# Patient Record
Sex: FEMALE | Race: WHITE | Employment: OTHER
[De-identification: names, ages, dates, MRNs, and addresses within clinical notes are randomized per-mention and may not be internally consistent; named-entity substitution may affect disease eponyms.]

---

## 2017-05-27 ENCOUNTER — HEALTH MAINTENANCE LETTER (OUTPATIENT)
Age: 67
End: 2017-05-27

## 2017-09-08 ENCOUNTER — APPOINTMENT (OUTPATIENT)
Dept: MRI IMAGING | Facility: CLINIC | Age: 67
End: 2017-09-08
Attending: EMERGENCY MEDICINE
Payer: MEDICARE

## 2017-09-08 ENCOUNTER — HOSPITAL ENCOUNTER (EMERGENCY)
Facility: CLINIC | Age: 67
Discharge: HOME | End: 2017-09-08
Attending: EMERGENCY MEDICINE | Admitting: EMERGENCY MEDICINE
Payer: MEDICARE

## 2017-09-08 VITALS
DIASTOLIC BLOOD PRESSURE: 84 MMHG | SYSTOLIC BLOOD PRESSURE: 143 MMHG | TEMPERATURE: 96.4 F | OXYGEN SATURATION: 93 % | HEART RATE: 64 BPM | WEIGHT: 117 LBS | BODY MASS INDEX: 20.08 KG/M2 | RESPIRATION RATE: 17 BRPM

## 2017-09-08 DIAGNOSIS — I44.7 LEFT BUNDLE-BRANCH BLOCK: ICD-10-CM

## 2017-09-08 DIAGNOSIS — R42 VERTIGO: ICD-10-CM

## 2017-09-08 LAB
ANION GAP SERPL CALCULATED.3IONS-SCNC: 12 MMOL/L (ref 3–14)
APTT PPP: 27 SEC (ref 22–37)
BASOPHILS # BLD AUTO: 0 10E9/L (ref 0–0.2)
BASOPHILS NFR BLD AUTO: 0.2 %
BUN SERPL-MCNC: 16 MG/DL (ref 7–30)
CALCIUM SERPL-MCNC: 8.5 MG/DL (ref 8.5–10.1)
CHLORIDE SERPL-SCNC: 107 MMOL/L (ref 94–109)
CO2 SERPL-SCNC: 27 MMOL/L (ref 20–32)
CREAT SERPL-MCNC: 0.57 MG/DL (ref 0.52–1.04)
DIFFERENTIAL METHOD BLD: NORMAL
EOSINOPHIL # BLD AUTO: 0.1 10E9/L (ref 0–0.7)
EOSINOPHIL NFR BLD AUTO: 1.1 %
ERYTHROCYTE [DISTWIDTH] IN BLOOD BY AUTOMATED COUNT: 13.4 % (ref 10–15)
GFR SERPL CREATININE-BSD FRML MDRD: >90 ML/MIN/1.7M2
GLUCOSE BLDC GLUCOMTR-MCNC: 71 MG/DL (ref 70–99)
GLUCOSE BLDC GLUCOMTR-MCNC: 89 MG/DL (ref 70–99)
GLUCOSE SERPL-MCNC: 86 MG/DL (ref 70–99)
HCT VFR BLD AUTO: 38.9 % (ref 35–47)
HGB BLD-MCNC: 12.8 G/DL (ref 11.7–15.7)
IMM GRANULOCYTES # BLD: 0 10E9/L (ref 0–0.4)
IMM GRANULOCYTES NFR BLD: 0.2 %
LYMPHOCYTES # BLD AUTO: 1.8 10E9/L (ref 0.8–5.3)
LYMPHOCYTES NFR BLD AUTO: 37.5 %
MCH RBC QN AUTO: 30.3 PG (ref 26.5–33)
MCHC RBC AUTO-ENTMCNC: 32.9 G/DL (ref 31.5–36.5)
MCV RBC AUTO: 92 FL (ref 78–100)
MONOCYTES # BLD AUTO: 0.2 10E9/L (ref 0–1.3)
MONOCYTES NFR BLD AUTO: 4.7 %
NEUTROPHILS # BLD AUTO: 2.6 10E9/L (ref 1.6–8.3)
NEUTROPHILS NFR BLD AUTO: 56.3 %
NRBC # BLD AUTO: 0 10*3/UL
NRBC BLD AUTO-RTO: 0 /100
PLATELET # BLD AUTO: 175 10E9/L (ref 150–450)
POTASSIUM SERPL-SCNC: 3.7 MMOL/L (ref 3.4–5.3)
RBC # BLD AUTO: 4.22 10E12/L (ref 3.8–5.2)
SODIUM SERPL-SCNC: 146 MMOL/L (ref 133–144)
WBC # BLD AUTO: 4.7 10E9/L (ref 4–11)

## 2017-09-08 PROCEDURE — 70544 MR ANGIOGRAPHY HEAD W/O DYE: CPT

## 2017-09-08 PROCEDURE — 25000128 H RX IP 250 OP 636: Performed by: EMERGENCY MEDICINE

## 2017-09-08 PROCEDURE — A9585 GADOBUTROL INJECTION: HCPCS | Performed by: EMERGENCY MEDICINE

## 2017-09-08 PROCEDURE — 70549 MR ANGIOGRAPH NECK W/O&W/DYE: CPT

## 2017-09-08 PROCEDURE — 96361 HYDRATE IV INFUSION ADD-ON: CPT

## 2017-09-08 PROCEDURE — 80048 BASIC METABOLIC PNL TOTAL CA: CPT | Performed by: EMERGENCY MEDICINE

## 2017-09-08 PROCEDURE — 00000146 ZZHCL STATISTIC GLUCOSE BY METER IP

## 2017-09-08 PROCEDURE — 99285 EMERGENCY DEPT VISIT HI MDM: CPT | Mod: 25

## 2017-09-08 PROCEDURE — 85730 THROMBOPLASTIN TIME PARTIAL: CPT | Performed by: EMERGENCY MEDICINE

## 2017-09-08 PROCEDURE — 93010 ELECTROCARDIOGRAM REPORT: CPT | Mod: Z6 | Performed by: EMERGENCY MEDICINE

## 2017-09-08 PROCEDURE — 93005 ELECTROCARDIOGRAM TRACING: CPT

## 2017-09-08 PROCEDURE — 99285 EMERGENCY DEPT VISIT HI MDM: CPT | Mod: 25 | Performed by: EMERGENCY MEDICINE

## 2017-09-08 PROCEDURE — 96360 HYDRATION IV INFUSION INIT: CPT | Mod: 59

## 2017-09-08 PROCEDURE — 85025 COMPLETE CBC W/AUTO DIFF WBC: CPT | Performed by: EMERGENCY MEDICINE

## 2017-09-08 PROCEDURE — 70553 MRI BRAIN STEM W/O & W/DYE: CPT

## 2017-09-08 RX ORDER — GADOBUTROL 604.72 MG/ML
7.5 INJECTION INTRAVENOUS ONCE
Status: COMPLETED | OUTPATIENT
Start: 2017-09-08 | End: 2017-09-08

## 2017-09-08 RX ORDER — CHLORAL HYDRATE 500 MG
1 CAPSULE ORAL DAILY
COMMUNITY
End: 2023-04-17

## 2017-09-08 RX ORDER — MECLIZINE HCL 12.5 MG 12.5 MG/1
12.5 TABLET ORAL 4 TIMES DAILY PRN
Qty: 30 TABLET | Refills: 0 | Status: SHIPPED | OUTPATIENT
Start: 2017-09-08 | End: 2019-03-17

## 2017-09-08 RX ADMIN — SODIUM CHLORIDE 500 ML: 9 INJECTION, SOLUTION INTRAVENOUS at 16:25

## 2017-09-08 RX ADMIN — GADOBUTROL 5.3 ML: 604.72 INJECTION INTRAVENOUS at 16:44

## 2017-09-08 RX ADMIN — SODIUM CHLORIDE 40 ML: 9 INJECTION, SOLUTION INTRAVENOUS at 16:45

## 2017-09-08 ASSESSMENT — ENCOUNTER SYMPTOMS
FEVER: 0
NECK PAIN: 1
NUMBNESS: 0
HEADACHES: 0
VOMITING: 0
WEAKNESS: 0
SHORTNESS OF BREATH: 0
FACIAL ASYMMETRY: 0
DIZZINESS: 1
SEIZURES: 0
NAUSEA: 0

## 2017-09-08 NOTE — ED AVS SNAPSHOT
[facility], [place], Emergency Department    [address]    [facility] [address]    Phone:  [phone]    Fax:  [phone]                                       Brianna Vallejo   MRN: [id number]    Department:  [facility], Emergency Department   Date of Visit:  9/8/2017           After Visit Summary Signature Page     I have received my discharge instructions, and my questions have been answered. I have discussed any challenges I see with this plan with the nurse or doctor.    ..........................................................................................................................................  Patient/Patient Representative Signature      ..........................................................................................................................................  Patient Representative Print Name and Relationship to Patient    ..................................................               ................................................  Date                                            Time    ..........................................................................................................................................  Reviewed by Signature/Title    ...................................................              ..............................................  Date                                                            Time

## 2017-09-08 NOTE — DISCHARGE INSTRUCTIONS
Please make an appointment to follow up with [facility] (phone: [phone]) as soon as possible if not improving.    Meclizine as directed, if needed for dizziness.    Return to the emergency Department for any problems.

## 2017-09-08 NOTE — ED PROVIDER NOTES
"    [facility] EMERGENCY DEPARTMENT ([facility])    9/08/17     ED 20    History     Chief Complaint   Patient presents with     Dizziness     Onset today at 215 with onset of dizziness lasting longer than usual, \"still feeling  a little off and not balanced.\"     The history is provided by the patient and medical records.     Brianna Vallejo is a 67 year old female who presents with dizziness and feelig noff balance. She has a history of right bundle branch block. Patient states she is  feeling odd for me.  At 2:15 pm today she started =feeling unsteady. She was sitting at the computer, then got up to walk down the stairs. She started to feel dizzy but was able to descend the stairs without problems. She has continued to feel  dizzyish  and was uncertain if she should drive. She had somewhere to be at 3pm today but deferred this based on how she felt. She describes the dizziness as off balance. She denies any lightheadedness, syncope, or near syncope. She states she doesn t consistently feel this dizzy sensation, and that it seems to be intermittent.  She noticed difficulty walking a straight line temporarily, but this subsided and she was later able to walk a straight line as normal. She denies any tinnitus symptoms. She denies any fevers, nausea, vomiting, falls, trauma. She has some right sided neck ache but this isn t severe. Her worst concern is that this is a stroke. Patient notes prior history of EKGs, asks if we have reviewed this.     I have reviewed the Medications, Allergies, Past Medical and Surgical History, and Social History in the [employer] system.    PAST MEDICAL HISTORY:   Past Medical History:   Diagnosis Date     Anxiety disorder      Chemical dependency (H)     in recovery     Genital herpes 1990s    2008, 2012     Gout      Insomnia      LBP (low back pain)      Osteopoikilosis      RBBB     neg cardiac workup 2008     Ulcerative colitis 1967       PAST SURGICAL HISTORY:   Past Surgical " History:   Procedure Laterality Date     LARYNX SURGERY  3/4/10       FAMILY HISTORY:   Family History   Problem Relation Age of Onset     Breast Cancer Mother      CEREBROVASCULAR DISEASE Father      Hypertension Father      Neurologic Disorder Maternal Grandmother      Arthritis Maternal Grandmother      C.A.D. Maternal Grandfather      Respiratory Maternal Grandfather      CEREBROVASCULAR DISEASE Paternal Grandmother      DIABETES Paternal Grandmother      Arthritis Paternal Grandmother      Alcohol/Drug Paternal Grandfather      DIABETES Brother        SOCIAL HISTORY:   Social History   Substance Use Topics     Smoking status: Former Smoker     Packs/day: 1.00     Years: 6.00     Types: Cigarettes     Quit date: 8/11/1974     Smokeless tobacco: Never Used     Alcohol use No       Discharge Medication List as of 9/8/2017  6:11 PM      START taking these medications    Details   meclizine (ANTIVERT) 12.5 MG tablet Take 1 tablet (12.5 mg) by mouth 4 times daily as needed for dizziness, Disp-30 tablet, R-0, Local Print         CONTINUE these medications which have NOT CHANGED    Details   Alendronate Sodium (FOSAMAX PO) Take by mouth once a week, Historical      ASPIRIN PO Take 81 mg by mouth daily, Historical      VITAMIN D, CHOLECALCIFEROL, PO Take 2,000 Units by mouth daily, Historical      Calcium Carb-Cholecalciferol (CALCIUM 1000 + D PO) Take 1,000 mg by mouth daily, Historical      fish oil-omega-3 fatty acids 1000 MG capsule Take 1 g by mouth daily, Historical      calcium carbonate (TUMS) 500 MG chewable tablet Take 1 chew tab by mouth daily, Historical      valACYclovir (VALTREX) 1000 mg tablet Take 1 tablet (1,000 mg) by mouth 2 times daily, Disp-20 tablet, R-2, E-Prescribe              No Known Allergies      Review of Systems   Constitutional: Negative for fever.   Respiratory: Negative for shortness of breath.    Cardiovascular: Negative for chest pain.   Gastrointestinal: Negative for nausea and  vomiting.   Musculoskeletal: Positive for neck pain.   Neurological: Positive for dizziness. Negative for seizures, syncope, facial asymmetry, weakness, numbness and headaches.   All other systems reviewed and are negative.      Physical Exam   BP: 146/83  Pulse: 70  Heart Rate: 70  Temp: 96.4  F (35.8  C)  Resp: 16  Weight: 53.1 kg (117 lb)  SpO2: 99 %  Physical Exam   Constitutional: She is oriented to person, place, and time. Vital signs are normal. She appears well-developed and well-nourished.  Non-toxic appearance. She does not appear ill. No distress.   HENT:   Head: Normocephalic and atraumatic.   Mouth/Throat: Oropharynx is clear and moist. No oropharyngeal exudate.   Eyes: Conjunctivae and EOM are normal. Pupils are equal, round, and reactive to light. No scleral icterus.   Neck: Normal range of motion. Neck supple. No JVD present. No tracheal deviation present. No thyromegaly present.   Cardiovascular: Normal rate, regular rhythm, normal heart sounds and intact distal pulses.  Exam reveals no gallop and no friction rub.    No murmur heard.  Pulmonary/Chest: Effort normal and breath sounds normal. No respiratory distress.   Abdominal: Soft. Bowel sounds are normal. She exhibits no distension and no mass. There is no tenderness.   Musculoskeletal: Normal range of motion. She exhibits no edema or tenderness.   Lymphadenopathy:     She has no cervical adenopathy.   Neurological: She is alert and oriented to person, place, and time. She has normal strength. No cranial nerve deficit or sensory deficit.   Normal finger-to-nose bilaterally.  No aphasia or dysarthria.  No pronator drift bilaterally.  No nystagmus.  Gait is cautious but steady.  Normal rapid alternating movements bilaterally.   Skin: Skin is warm and dry. No rash noted. No erythema. No pallor.   Psychiatric: She has a normal mood and affect. Her behavior is normal.   Nursing note and vitals reviewed.      ED Course     ED Course     EKG 12-lead,  tracing only    Date/Time: 9/8/2017 4:00 PM  Performed by: CARLOS LATHAM  Authorized by: CARLOS LATHAM   Interpreted by ED physician  Comparison: compared with previous ECG from 3/2/2007  Similar to previous ECG  Rhythm: sinus rhythm  Rate: normal  BPM: 62  Conduction: complete LBBB  ST Segments: ST segments normal  T Waves: T waves normal  Other: no other findings  Clinical impression comment: Left Bundle-branch block, no obvious acute ischemic changes or arrhythmias            Results for orders placed or performed during the hospital encounter of 09/08/17   MR Brain w/o & w Contrast    Narrative    MRI BRAIN WITHOUT AND WITH CONTRAST  9/8/2017 5:24 PM    HISTORY:  Dizziness for one hour today. Evaluate for stroke.     TECHNIQUE:  Multiplanar, multisequence MRI of the brain without and  with 5.3 mL Gadavist IV    COMPARISON: None.    FINDINGS:  The brain parenchyma, ventricles and subarachnoid spaces  appear normal.  There is no evidence of hemorrhage, mass, acute  infarct, or anomaly.  There are no gadolinium enhancing lesions.    The facial structures appear normal. The arteries at the base of the  brain and the dural venous sinuses appear patent.       Impression    IMPRESSION: Normal MRI of the brain.      AMANDA DAN MD   MR Head w/o Contrast Angiogram    Narrative    MR ANGIOGRAM OF THE HEAD WITHOUT CONTRAST   9/8/2017 5:09 PM     HISTORY: Dizziness for one hour today.    TECHNIQUE:  3D time-of-flight MR angiogram of the head without  contrast.    COMPARISON: None.    FINDINGS:  The visualized portions of the distal internal carotid and  vertebral arteries, the basilar artery, [place], and the  proximal anterior, middle and posterior cerebral arteries all appear  normal.  There is no evidence of aneurysm or vascular stenosis or  occlusion.      Impression    IMPRESSION:  Normal MR angiogram of the head.      AMANDA DAN MD   MR Neck w/o & w Contrast Angiogram    Narrative     MRA NECK WITHOUT AND WITH CONTRAST  9/8/2017 5:24 PM     HISTORY: Dizziness for one hours today. Possible stroke.    TECHNIQUE: 2D time-of-flight MR angiogram of the neck without contrast  and 3D MR angiogram of the neck with  5.3 mL Gadavist IV. Estimates of  carotid stenoses are made relative to the distal internal carotid  artery diameters except as noted.    COMPARISON: None.    FINDINGS:    Right Carotid:  Normal.    Left Carotid:  Normal.    Vertebral and Basilar:   Normal.    Aortic Arch and Branches:  Normal.      Impression    IMPRESSION:  Normal MR angiogram of the neck.      AMANDA DAN MD   CBC with platelets differential   Result Value Ref Range    WBC 4.7 4.0 - 11.0 10e9/L    RBC Count 4.22 3.8 - 5.2 10e12/L    Hemoglobin 12.8 11.7 - 15.7 g/dL    Hematocrit 38.9 35.0 - 47.0 %    MCV 92 78 - 100 fl    MCH 30.3 26.5 - 33.0 pg    MCHC 32.9 31.5 - 36.5 g/dL    RDW 13.4 10.0 - 15.0 %    Platelet Count 175 150 - 450 10e9/L    Diff Method Automated Method     % Neutrophils 56.3 %    % Lymphocytes 37.5 %    % Monocytes 4.7 %    % Eosinophils 1.1 %    % Basophils 0.2 %    % Immature Granulocytes 0.2 %    Nucleated RBCs 0 0 /100    Absolute Neutrophil 2.6 1.6 - 8.3 10e9/L    Absolute Lymphocytes 1.8 0.8 - 5.3 10e9/L    Absolute Monocytes 0.2 0.0 - 1.3 10e9/L    Absolute Eosinophils 0.1 0.0 - 0.7 10e9/L    Absolute Basophils 0.0 0.0 - 0.2 10e9/L    Abs Immature Granulocytes 0.0 0 - 0.4 10e9/L    Absolute Nucleated RBC 0.0    Basic metabolic panel   Result Value Ref Range    Sodium 146 (H) 133 - 144 mmol/L    Potassium 3.7 3.4 - 5.3 mmol/L    Chloride 107 94 - 109 mmol/L    Carbon Dioxide 27 20 - 32 mmol/L    Anion Gap 12 3 - 14 mmol/L    Glucose 86 70 - 99 mg/dL    Urea Nitrogen 16 7 - 30 mg/dL    Creatinine 0.57 0.52 - 1.04 mg/dL    GFR Estimate >90 >60 mL/min/1.7m2    GFR Estimate If Black >90 >60 mL/min/1.7m2    Calcium 8.5 8.5 - 10.1 mg/dL   Partial thromboplastin time   Result Value Ref Range    PTT 27  22 - 37 sec   Glucose by meter   Result Value Ref Range    Glucose 71 70 - 99 mg/dL   Glucose by meter   Result Value Ref Range    Glucose 89 70 - 99 mg/dL   EKG 12-lead, tracing only   Result Value Ref Range    Interpretation ECG Click View Image link to view waveform and result                   Assessments & Plan (with Medical Decision Making)   This patient presented to the emergency department complaining of dizziness.  Does seem most consistent with complaint of vertigo by the patient s description.  She had a nonfocal neurologic exam and MRI of the brain with MRI of head and neck demonstrated no signs of stroke or cervical facile disease.  She s had no recent medication changes that would cause these symptoms, and my6 suspicion at this point is that this is more of a peripheral vertigo as the patient does report that it does get somewhat worse when she goes from laying to sitting and with certain head movements.  She will be [facility] the [facility] and was also provided with a prescription for meclizine before being discharged in good condition.    This part of the document was transcribed by Avelino Tijerina, Medical Scribe.       I have reviewed the nursing notes.    I have reviewed the findings, diagnosis, plan and need for follow up with the patient.    Discharge Medication List as of 9/8/2017  6:11 PM      START taking these medications    Details   meclizine (ANTIVERT) 12.5 MG tablet Take 1 tablet (12.5 mg) by mouth 4 times daily as needed for dizziness, Disp-30 tablet, R-0, Local Print             Final diagnoses:   Vertigo   I, Jacy Collins, am serving as a trained medical scribe to document services personally performed by Patricio Rojo MD, based on the provider's statements to me.    I, Logan Rojo MD, was physically present and have reviewed and verified the accuracy of this note documented by Jacy Collins, medical scribe.      9/8/2017   [facility], [place], EMERGENCY DEPARTMENT.      Patricio Rojo MD  09/15/17 1082

## 2017-09-08 NOTE — ED AVS SNAPSHOT
[facility], Emergency Department    [address]    [facility] [place] [address]    Phone:  [phone]    Fax:  [phone]                                       Brianna Vallejo   MRN: [id number]    Department:  [facility], Emergency Department   Date of Visit:  9/8/2017           Patient Information     Date Of Birth          1950        Your diagnoses for this visit were:     Vertigo        You were seen by Patricio Rojo MD.        Discharge Instructions       Please make an appointment to follow up with [facility] (phone: [phone]) as soon as possible if not improving.    Meclizine as directed, if needed for dizziness.    Return to the emergency Department for any problems.      Discharge References/Attachments     VERTIGO, UNSPECIFIED (ENGLISH)      24 Hour Appointment Hotline       To make an appointment at any [facility] clinic, call [phone] ([phone]). If you don't have a family doctor or clinic, we will help you find one. [facility] clinics are conveniently located to serve the needs of you and your family.             Review of your medicines      START taking        Dose / Directions Last dose taken    meclizine 12.5 MG tablet   Commonly known as:  ANTIVERT   Dose:  12.5 mg   Quantity:  30 tablet        Take 1 tablet (12.5 mg) by mouth 4 times daily as needed for dizziness   Refills:  0          Our records show that you are taking the medicines listed below. If these are incorrect, please call your family doctor or clinic.        Dose / Directions Last dose taken    ASPIRIN PO   Dose:  81 mg        Take 81 mg by mouth daily   Refills:  0        CALCIUM 1000 + D PO   Dose:  1000 mg        Take 1,000 mg by mouth daily   Refills:  0        fish oil-omega-3 fatty acids 1000 MG capsule   Dose:  1 g        Take 1 g by mouth daily   Refills:  0        FOSAMAX PO        Take by mouth once a week   Refills:  0        TUMS 500 MG chewable tablet   Dose:  1 chew tab   Generic  drug:  calcium carbonate        Take 1 chew tab by mouth daily   Refills:  0        valACYclovir 1000 mg tablet   Commonly known as:  VALTREX   Dose:  1000 mg   Quantity:  20 tablet        Take 1 tablet (1,000 mg) by mouth 2 times daily   Refills:  2        VITAMIN D (CHOLECALCIFEROL) PO   Dose:  2000 Units        Take 2,000 Units by mouth daily   Refills:  0                Prescriptions were sent or printed at these locations (1 Prescription)                   Other Prescriptions                Printed at Department/Unit printer (1 of 1)         meclizine (ANTIVERT) 12.5 MG tablet                Procedures and tests performed during your visit     Procedure/Test Number of Times Performed    Activity: Bedrest 1    Basic metabolic panel 1    CBC with platelets differential 1    Dysphagia Screen 1    EKG 12-lead, tracing only 1    Glucose by meter 2    Glucose monitor nursing POCT 1    MR Brain w/o & w Contrast 1    MR Head w/o Contrast Angiogram 1    MR Neck w/o & w Contrast Angiogram 1    Notify CT that Stroke patient is in ED 1    Partial thromboplastin time 1    Pulse oximetry nursing 1    Vital signs and neuro checks 1      Orders Needing Specimen Collection     None      Pending Results     Date and Time Order Name Status Description    9/8/2017 1551 EKG 12-lead, tracing only Preliminary             Pending Culture Results     No orders found from 9/6/2017 to 9/9/2017.            Pending Results Instructions     If you had any lab results that were not finalized at the time of your Discharge, you can call the ED Lab Result RN at [phone]. You will be contacted by this team for any positive Lab results or changes in treatment. The nurses are available 7 days a week from 10A to 6:30P.  You can leave a message 24 hours per day and they will return your call.        Thank you for choosing [facility]       Thank you for choosing [facility] for your care. Our goal is always to provide you with excellent care. Hearing  back from our patients is one way we can continue to improve our services. Please take a few minutes to complete the written survey that you may receive in the mail after you visit with us. Thank you!        [facility] Information     [facility] gives you secure access to your electronic health record. If you see a primary care provider, you can also send messages to your care team and make appointments. If you have questions, please call your primary care clinic.  If you do not have a primary care provider, please call [phone] and they will assist you.        Care EveryWhere ID     This is your Care EveryWhere ID. This could be used by other organizations to access your [place] medical records  [id number]        Equal Access to Services     MAGDA IBARRA : Colton Fraire, renea langston, arnoldo mckoy, jb cisneros. So [facility] [place] [phone].    ATENCIÓN: Si habla español, tiene a fitzgerald disposición servicios gratuitos de asistencia lingüística. Llame al [phone].    We comply with applicable federal civil rights laws and [place] laws. We do not discriminate on the basis of race, color, national origin, age, disability sex, sexual orientation or gender identity.            After Visit Summary       This is your record. Keep this with you and show to your community pharmacist(s) and doctor(s) at your next visit.

## 2017-09-15 LAB — INTERPRETATION ECG - MUSE: NORMAL

## 2017-12-19 ENCOUNTER — RADIANT APPOINTMENT (OUTPATIENT)
Dept: MAMMOGRAPHY | Facility: CLINIC | Age: 67
End: 2017-12-19
Attending: FAMILY MEDICINE
Payer: MEDICARE

## 2017-12-19 DIAGNOSIS — Z12.31 VISIT FOR SCREENING MAMMOGRAM: ICD-10-CM

## 2017-12-19 PROCEDURE — G0202 SCR MAMMO BI INCL CAD: HCPCS

## 2019-03-14 ENCOUNTER — RECORDS - HEALTHEAST (OUTPATIENT)
Dept: LAB | Facility: CLINIC | Age: 69
End: 2019-03-14

## 2019-03-14 LAB
CHOLEST SERPL-MCNC: 210 MG/DL
FASTING STATUS PATIENT QL REPORTED: NO
HDLC SERPL-MCNC: 68 MG/DL
LDLC SERPL CALC-MCNC: 120 MG/DL
TRIGL SERPL-MCNC: 111 MG/DL

## 2019-03-17 ENCOUNTER — APPOINTMENT (OUTPATIENT)
Dept: GENERAL RADIOLOGY | Facility: CLINIC | Age: 69
End: 2019-03-17
Attending: NURSE PRACTITIONER
Payer: COMMERCIAL

## 2019-03-17 ENCOUNTER — HOSPITAL ENCOUNTER (OUTPATIENT)
Facility: CLINIC | Age: 69
Setting detail: OBSERVATION
Discharge: HOME | End: 2019-03-18
Attending: EMERGENCY MEDICINE | Admitting: EMERGENCY MEDICINE
Payer: COMMERCIAL

## 2019-03-17 DIAGNOSIS — F41.8 OTHER SPECIFIED ANXIETY DISORDERS: Primary | ICD-10-CM

## 2019-03-17 DIAGNOSIS — M79.602 ARM PAIN, LEFT: ICD-10-CM

## 2019-03-17 DIAGNOSIS — R07.89 OTHER CHEST PAIN: ICD-10-CM

## 2019-03-17 LAB
ANION GAP SERPL CALCULATED.3IONS-SCNC: 8 MMOL/L (ref 3–14)
BASOPHILS # BLD AUTO: 0 10E9/L (ref 0–0.2)
BASOPHILS NFR BLD AUTO: 0.2 %
BUN SERPL-MCNC: 14 MG/DL (ref 7–30)
CALCIUM SERPL-MCNC: 8.3 MG/DL (ref 8.5–10.1)
CHLORIDE SERPL-SCNC: 108 MMOL/L (ref 94–109)
CO2 SERPL-SCNC: 28 MMOL/L (ref 20–32)
CREAT SERPL-MCNC: 0.73 MG/DL (ref 0.52–1.04)
DIFFERENTIAL METHOD BLD: NORMAL
EOSINOPHIL # BLD AUTO: 0 10E9/L (ref 0–0.7)
EOSINOPHIL NFR BLD AUTO: 0.7 %
ERYTHROCYTE [DISTWIDTH] IN BLOOD BY AUTOMATED COUNT: 12.9 % (ref 10–15)
GFR SERPL CREATININE-BSD FRML MDRD: 83 ML/MIN/{1.73_M2}
GLUCOSE SERPL-MCNC: 88 MG/DL (ref 70–99)
HCT VFR BLD AUTO: 40.7 % (ref 35–47)
HGB BLD-MCNC: 13.3 G/DL (ref 11.7–15.7)
IMM GRANULOCYTES # BLD: 0 10E9/L (ref 0–0.4)
IMM GRANULOCYTES NFR BLD: 0.2 %
LYMPHOCYTES # BLD AUTO: 1.7 10E9/L (ref 0.8–5.3)
LYMPHOCYTES NFR BLD AUTO: 40.2 %
MCH RBC QN AUTO: 30 PG (ref 26.5–33)
MCHC RBC AUTO-ENTMCNC: 32.7 G/DL (ref 31.5–36.5)
MCV RBC AUTO: 92 FL (ref 78–100)
MONOCYTES # BLD AUTO: 0.3 10E9/L (ref 0–1.3)
MONOCYTES NFR BLD AUTO: 6.3 %
NEUTROPHILS # BLD AUTO: 2.1 10E9/L (ref 1.6–8.3)
NEUTROPHILS NFR BLD AUTO: 52.4 %
NRBC # BLD AUTO: 0 10*3/UL
NRBC BLD AUTO-RTO: 0 /100
PLATELET # BLD AUTO: 185 10E9/L (ref 150–450)
POTASSIUM SERPL-SCNC: 3.7 MMOL/L (ref 3.4–5.3)
RBC # BLD AUTO: 4.44 10E12/L (ref 3.8–5.2)
SODIUM SERPL-SCNC: 144 MMOL/L (ref 133–144)
TROPONIN I SERPL-MCNC: <0.015 UG/L (ref 0–0.04)
WBC # BLD AUTO: 4.1 10E9/L (ref 4–11)

## 2019-03-17 PROCEDURE — 93005 ELECTROCARDIOGRAM TRACING: CPT | Performed by: EMERGENCY MEDICINE

## 2019-03-17 PROCEDURE — 93010 ELECTROCARDIOGRAM REPORT: CPT | Mod: Z6 | Performed by: EMERGENCY MEDICINE

## 2019-03-17 PROCEDURE — 84484 ASSAY OF TROPONIN QUANT: CPT | Mod: 91 | Performed by: EMERGENCY MEDICINE

## 2019-03-17 PROCEDURE — 99285 EMERGENCY DEPT VISIT HI MDM: CPT | Mod: 25 | Performed by: EMERGENCY MEDICINE

## 2019-03-17 PROCEDURE — G0378 HOSPITAL OBSERVATION PER HR: HCPCS

## 2019-03-17 PROCEDURE — A9270 NON-COVERED ITEM OR SERVICE: HCPCS | Mod: GY | Performed by: EMERGENCY MEDICINE

## 2019-03-17 PROCEDURE — 84484 ASSAY OF TROPONIN QUANT: CPT | Mod: 91 | Performed by: NURSE PRACTITIONER

## 2019-03-17 PROCEDURE — 80048 BASIC METABOLIC PNL TOTAL CA: CPT | Performed by: EMERGENCY MEDICINE

## 2019-03-17 PROCEDURE — 85025 COMPLETE CBC W/AUTO DIFF WBC: CPT | Performed by: EMERGENCY MEDICINE

## 2019-03-17 PROCEDURE — 25000132 ZZH RX MED GY IP 250 OP 250 PS 637: Mod: GY | Performed by: EMERGENCY MEDICINE

## 2019-03-17 PROCEDURE — 71046 X-RAY EXAM CHEST 2 VIEWS: CPT

## 2019-03-17 PROCEDURE — 99220 ZZC INITIAL OBSERVATION CARE,LEVL III: CPT | Mod: 25 | Performed by: EMERGENCY MEDICINE

## 2019-03-17 RX ORDER — NALOXONE HYDROCHLORIDE 0.4 MG/ML
.1-.4 INJECTION, SOLUTION INTRAMUSCULAR; INTRAVENOUS; SUBCUTANEOUS
Status: DISCONTINUED | OUTPATIENT
Start: 2019-03-17 | End: 2019-03-18 | Stop reason: HOSPADM

## 2019-03-17 RX ORDER — ASPIRIN 81 MG/1
81 TABLET ORAL DAILY
Status: DISCONTINUED | OUTPATIENT
Start: 2019-03-18 | End: 2019-03-18 | Stop reason: HOSPADM

## 2019-03-17 RX ORDER — ALUMINA, MAGNESIA, AND SIMETHICONE 2400; 2400; 240 MG/30ML; MG/30ML; MG/30ML
30 SUSPENSION ORAL EVERY 4 HOURS PRN
Status: DISCONTINUED | OUTPATIENT
Start: 2019-03-17 | End: 2019-03-18 | Stop reason: HOSPADM

## 2019-03-17 RX ORDER — NITROGLYCERIN 0.4 MG/1
0.4 TABLET SUBLINGUAL EVERY 5 MIN PRN
Status: DISCONTINUED | OUTPATIENT
Start: 2019-03-17 | End: 2019-03-18 | Stop reason: HOSPADM

## 2019-03-17 RX ORDER — LIDOCAINE 40 MG/G
CREAM TOPICAL
Status: DISCONTINUED | OUTPATIENT
Start: 2019-03-17 | End: 2019-03-18 | Stop reason: HOSPADM

## 2019-03-17 RX ORDER — ACETAMINOPHEN 325 MG/1
650 TABLET ORAL EVERY 4 HOURS PRN
Status: DISCONTINUED | OUTPATIENT
Start: 2019-03-17 | End: 2019-03-18 | Stop reason: HOSPADM

## 2019-03-17 RX ORDER — ASPIRIN 81 MG/1
324 TABLET, CHEWABLE ORAL ONCE
Status: COMPLETED | OUTPATIENT
Start: 2019-03-17 | End: 2019-03-17

## 2019-03-17 RX ORDER — ALENDRONATE SODIUM 70 MG/1
70 TABLET ORAL
COMMUNITY
End: 2023-04-17

## 2019-03-17 RX ADMIN — ASPIRIN 81 MG CHEWABLE TABLET 324 MG: 81 TABLET CHEWABLE at 07:53

## 2019-03-17 ASSESSMENT — ENCOUNTER SYMPTOMS
LIGHT-HEADEDNESS: 0
WEAKNESS: 0
DIZZINESS: 0
DIAPHORESIS: 0
SHORTNESS OF BREATH: 0
NAUSEA: 0
FEVER: 0
COLOR CHANGE: 0
CHEST TIGHTNESS: 0
COUGH: 0
NECK STIFFNESS: 0
ABDOMINAL PAIN: 0
EYE REDNESS: 0
HEADACHES: 0
PALPITATIONS: 0
RHINORRHEA: 0
VOMITING: 0
CONFUSION: 0
DIFFICULTY URINATING: 0
BRUISES/BLEEDS EASILY: 0
FATIGUE: 0
SORE THROAT: 0
NUMBNESS: 0
SINUS PAIN: 0
ARTHRALGIAS: 0
APPETITE CHANGE: 0

## 2019-03-17 NOTE — H&P
"[facility]   History & Physical    Brianna Vallejo MRN# [id number]   Age: 68 year old YOB: 1950     Date of Admission: 3/17/2019    Primary Care Provider: Aguila Bell         Chief Complaint:   \"left arm heaviness\"         History of Present Illness:   Brianna Vallejo is a 68 year old female w/PMH significant for UC, LBBB, insomnia, gout, anxiety who presented to the ED with left arm tightness.  Per ER MD, \"No chest pain or dyspnea. No diaphoresis or nausea. No abdominal pain or back pain. No fever or chills. No cough. No leg pain or swelling. At a routine clinic visit this week she was noted to have elevated blood pressure and has been anxious about it since then. No history of chronic hypertension. No diabetes. Mild cholesterol elevation last check 210 total cholesterol. Nonsmoker. Family history of coronary artery disease. She has long history of left bundle branch block. She had a Cardiolyte scan in 2016.\"  In the ED the patient's vital signs were stable, she was afebrile.  BMP unremarkable, CBC unremarkable.  Troponin negative X 2.  Chest x-ray negative for acute abnormalities.  EKG showed no ischemic changes and known LBBB.  She was given aspirin 324 mg po and subsequently admitted to the observation unit for ACS rule out.   On admission to the observation unit the patient denied current left arm heaviness, chest pain, SOB, cough, palpitations, nausea, or lightheadedness.  She did admit to some recent anxiety and feels this may be anxiety.          Review of Systems:     All others reviewed and are negative         Past Medical History:     Past Medical History:   Diagnosis Date     Anxiety disorder      Chemical dependency (H)     in recovery     Genital herpes 1990s    2008, 2012     Gout      Insomnia      LBP (low back pain)      Osteopoikilosis      RBBB     neg cardiac workup 2008     Ulcerative colitis 1967             Past Surgical History:    "   Past Surgical History:   Procedure Laterality Date     LARYNX SURGERY  3/4/10             Family History:     Family History   Problem Relation Age of Onset     Breast Cancer Mother      Cerebrovascular Disease Father      Hypertension Father      Neurologic Disorder Maternal Grandmother      Arthritis Maternal Grandmother      C.A.D. Maternal Grandfather      Respiratory Maternal Grandfather      Cerebrovascular Disease Paternal Grandmother      Diabetes Paternal Grandmother      Arthritis Paternal Grandmother      Alcohol/Drug Paternal Grandfather      Diabetes Brother              Social History:     Social History     Tobacco Use     Smoking status: Former Smoker     Packs/day: 1.00     Years: 6.00     Pack years: 6.00     Types: Cigarettes     Last attempt to quit: 1974     Years since quittin.6     Smokeless tobacco: Never Used   Substance Use Topics     Alcohol use: No             Medications:     No current facility-administered medications on file prior to encounter.   Current Outpatient Medications on File Prior to Encounter:  Alendronate Sodium (FOSAMAX PO) Take by mouth once a week   ASPIRIN PO Take 81 mg by mouth daily   Calcium Carb-Cholecalciferol (CALCIUM 1000 + D PO) Take 1,000 mg by mouth daily   fish oil-omega-3 fatty acids 1000 MG capsule Take 1 g by mouth daily   TURMERIC PO    VITAMIN D, CHOLECALCIFEROL, PO Take 2,000 Units by mouth daily   calcium carbonate (TUMS) 500 MG chewable tablet Take 1 chew tab by mouth daily   meclizine (ANTIVERT) 12.5 MG tablet Take 1 tablet (12.5 mg) by mouth 4 times daily as needed for dizziness   valACYclovir (VALTREX) 1000 mg tablet Take 1 tablet (1,000 mg) by mouth 2 times daily            Allergies:   No Known Allergies          Physical Exam:   /81   Pulse 66   Temp 97.8  F (36.6  C) (Oral)   Resp (!) 7   Wt 53.5 kg (118 lb)   SpO2 99%   BMI 20.25 kg/m     GENERAL: Alert and oriented x 3. NAD.   HEENT: Anicteric sclera. PERRL. Mucous  membranes moist and without lesions.   CV: RRR. S1, S2. No murmurs appreciated.   RESPIRATORY: Effort normal. Lungs CTAB with no wheezing, rales, rhonchi.   GI: Abdomen soft and non distended with normoactive bowel sounds present in all quadrants. No tenderness, rebound, guarding.   MUSCULOSKELETAL: No joint swelling or tenderness. Moves all extremities.   NEUROLOGICAL: No focal deficits. Strength 5/5 bilaterally in upper and lower extremities.   EXTREMITIES: No peripheral edema. Intact bilateral pedal pulses.   SKIN: No jaundice. No rashes.          Labs:   CBC:  Recent Labs   Lab Test 03/17/19  0515   WBC 4.1   RBC 4.44   HGB 13.3   HCT 40.7   MCV 92   MCH 30.0   MCHC 32.7   RDW 12.9          CMP:  Recent Labs   Lab Test 03/17/19  0515      POTASSIUM 3.7   CHLORIDE 108   DARNELL 8.3*   CO2 28   BUN 14   CR 0.73   GLC 88       INR:   No results for input(s): INR in the last 60038 hours.         Imaging:   XR CHEST 2 VW 3/17/2019 8:43 AM      HISTORY: left arm pain, chest pain                                                                      IMPRESSION: Pulmonary hyperinflation. The lungs appear clear. No  pleural effusion.     KAT SALOMON MD         Assessment and Plan:   Brianna Vallejo is a 68 year old female w/PMH significant for UC, LBBB, insomnia, gout, anxiety who presented to the ED with left arm tightness.      1. Left arm pain:  In the ED the patient's vital signs were stable, she was afebrile.  BMP unremarkable, CBC unremarkable.  Troponin negative X 3.  Chest x-ray negative for acute abnormalities.  EKG showed no ischemic changes and known LBBB.  She was given aspirin 324 mg po and subsequently admitted to the observation unit for ACS rule out.   On admission to the observation unit the patient denied current chest heaviness, arm heaviness, SOB, cough, lightheadedness, palpitations, or nausea. Risk factors: +family history, +age.  -admit to the observation unit  -telemetry  -NM stress test  in am  -continue aspirin 81 mg po daily  -fasting lipid panel in am  -repeat EKG with recurrence of symptoms        Discussed with Dr. Huber.     FEN: cardiac w/w caffeine, NPO at MN  Prophylaxis: anticipate short stay  Code Status: Full        Lea Abreu APRN, CNP  Ascom # [id number]

## 2019-03-17 NOTE — ED NOTES
Attempted IV start x 1. Blood drawn and sent. Unable to insert IV. Will reassess need for IV placement as necessary.

## 2019-03-17 NOTE — PHARMACY-ADMISSION MEDICATION HISTORY
Admission Medication History status for the 3/17/2019 admission is complete.  See EPIC admission navigator for Prior to Admission medications.    Medication history sources:  Patient     Medication history source reliability: Moderate    Medication adherence:  Moderate    Changes made to PTA medication list (reason)  Added: None  Deleted:     -Aspirin 81 mg daily --- patient reports not taking     -Meclizine 12.5 mg four times daily prn --- patient reports not taking     -Valacyclovir 1000 mg twice daily --- patient reports not taking     -Vitamin D 2000 units daily --- patient reports only taking Calcium + D vitamin  Changed: added strength to Alendronate    Additional medication history information (including reliability of information, actions taken by pharmacist):   -Patient was unsure about most of the strengths of her supplements, reports just grabbing them off of the shelf at the pharmacy.   -Usually takes Alendronate on Wednesdays but reports forgetting to take this past week.   -Believes she takes 1200 mg of Calcium and 2000 units of Vitamin D daily.     Time spent in this activity: 20 minutes    Medication history completed by: SARAH Huffman    Prior to Admission medications    Medication Sig Last Dose Taking? Auth Provider   alendronate (FOSAMAX) 70 MG tablet Take 70 mg by mouth every 7 days on Wednesdays 3/6/2019 Yes Unknown, Entered By History   Calcium Carb-Cholecalciferol 600-1000 MG-UNIT CAPS Take 2 capsules by mouth daily 3/16/2019 at AM Yes Unknown, Entered By History   calcium carbonate (TUMS) 500 MG chewable tablet Take 1 chew tab by mouth daily 3/16/2019 at Unknown time Yes Reported, Patient   fish oil-omega-3 fatty acids 1000 MG capsule Take 1 g by mouth daily 3/16/2019 at Unknown time Yes Reported, Patient   TURMERIC PO Take 1 tablet by mouth daily  3/17/2019 at Unknown time Yes Reported, Patient

## 2019-03-17 NOTE — ED NOTES
Patient transported to [address] via EMS. VSS. Denies pain, pressure in chest. Denies current anxiety.     Observation Brochure and Video    Patient informed of observation status based on provider's order.  Observation brochure was given and the video watched. Patient/Family stated understanding. Questions answered.  Chiquita Pedraza

## 2019-03-17 NOTE — ED NOTES
"[facility], [place]   ED Nurse to Floor Handoff     Brianna Vallejo is a 68 year old female who speaks English and lives with a spouse,  in a home  They arrived in the ED by car from home    ED Chief Complaint: Hypertension (Pt took BP at home and was 150 systolic. Pt has been taking frequent BPs since clinic visit on Wednesday. Reports anxiety and tight feeling in L arm \"feels muscular\")    ED Dx;   Final diagnoses:   Arm pain, left         Needed?: No    Allergies: No Known Allergies.  Past Medical Hx:   Past Medical History:   Diagnosis Date     Anxiety disorder      Chemical dependency (H)     in recovery     Genital herpes 1990s    2008, 2012     Gout      Insomnia      LBP (low back pain)      Osteopoikilosis      RBBB     neg cardiac workup 2008     Ulcerative colitis 1967      Baseline Mental status: WDL  Current Mental Status changes: at basesline    Infection present or suspected this encounter: no  Sepsis suspected: No  Isolation type: No active isolations     Activity level - Baseline/Home:  Independent  Activity Level - Current:   Independent    Bariatric equipment needed?: No    In the ED these meds were given:   Medications   lidocaine 1 % 0.1-1 mL (not administered)   lidocaine (LMX4) cream (not administered)   sodium chloride (PF) 0.9% PF flush 3 mL (not administered)   sodium chloride (PF) 0.9% PF flush 3 mL (not administered)   aspirin (ASA) chewable tablet 324 mg (324 mg Oral Given 3/17/19 3743)       Drips running?  No    Home pump  No    Current LDAs       Labs results:   Labs Ordered and Resulted from Time of ED Arrival Up to the Time of Departure from the ED   BASIC METABOLIC PANEL - Abnormal; Notable for the following components:       Result Value    Calcium 8.3 (*)     All other components within normal limits   CBC WITH PLATELETS DIFFERENTIAL   TROPONIN I   TROPONIN I   TROPONIN I   PERIPHERAL IV CATHETER       Imaging Studies:   Recent Results (from " the past 24 hour(s))   XR Chest 2 Views    Narrative    XR CHEST 2 VW 3/17/2019 8:43 AM     HISTORY: left arm pain, chest pain      Impression    IMPRESSION: Pulmonary hyperinflation. The lungs appear clear. No  pleural effusion.    KAT SALOMON MD       Recent vital signs:   /81   Pulse 66   Temp 97.8  F (36.6  C) (Oral)   Resp (!) 7   Wt 53.5 kg (118 lb)   SpO2 99%   BMI 20.25 kg/m      Cardiac Rhythm: hx of left bundle branch block       Pt needs tele? Yes  Skin/wound Issues: None    Code Status: Full Code    Pain control: good    Nausea control: pt had none    Abnormal labs/tests/findings requiring intervention: Low calcium    Family present during ED course? Yes   Family Comments/Social Situation comments:     Tasks needing completion: None    Francisca Hua RN  [facility]-- [phone] [place] [facility]  [phone] [facility] ED

## 2019-03-17 NOTE — ED PROVIDER NOTES
"  History     Chief Complaint   Patient presents with     Hypertension     Pt took BP at home and was 150 systolic. Pt has been taking frequent BPs since clinic visit on Wednesday. Reports anxiety and tight feeling in L arm \"feels muscular\"     HPI  Brianna Vallejo is a 68 year old female who presents with left arm tightness that began approximately 2 hours prior to admission. No chest pain or dyspnea. No diaphoresis or nausea. No abdominal pain or back pain. No fever or chills. No cough. No leg pain or swelling. At a routine clinic visit this week she was noted to have elevated blood pressure and has been anxious about it since then. No history of chronic hypertension. No diabetes. Mild cholesterol elevation last check 210 total cholesterol. Nonsmoker. Family history of coronary artery disease. She has long history of left bundle branch block. She had a Cardiolyte scan in 2016.    I have reviewed the Medications, Allergies, Past Medical and Surgical History, and Social History in the [employer] system.  Past Medical History:   Diagnosis Date     Anxiety disorder      Chemical dependency (H)     in recovery     Genital herpes 1990s    2008, 2012     Gout      Insomnia      LBP (low back pain)      Osteopoikilosis      RBBB     neg cardiac workup 2008     Ulcerative colitis 1967         Review of Systems   Constitutional: Negative for appetite change, diaphoresis, fatigue and fever.   HENT: Negative for congestion, rhinorrhea, sinus pain and sore throat.    Eyes: Negative for redness and visual disturbance.   Respiratory: Negative for cough, chest tightness and shortness of breath.    Cardiovascular: Negative for chest pain, palpitations and leg swelling.        Left arm tightness.   Gastrointestinal: Negative for abdominal pain, nausea and vomiting.   Genitourinary: Negative for difficulty urinating.   Musculoskeletal: Negative for arthralgias and neck stiffness.   Skin: Negative for color change.   Allergic/Immunologic: " Negative for immunocompromised state.   Neurological: Negative for dizziness, syncope, weakness, light-headedness, numbness and headaches.   Hematological: Does not bruise/bleed easily.   Psychiatric/Behavioral: Negative for confusion.       Physical Exam   BP: (!) 153/93  Pulse: 66  Heart Rate: 81  Temp: 97.8  F (36.6  C)  Resp: 18  Weight: 53.5 kg (118 lb)  SpO2: 99 %      Physical Exam   Constitutional: She is oriented to person, place, and time. She appears well-developed and well-nourished. No distress.   HENT:   Head: Normocephalic and atraumatic.   Mouth/Throat: Oropharynx is clear and moist.   Eyes: Conjunctivae and EOM are normal. Pupils are equal, round, and reactive to light.   Neck: Normal range of motion. Neck supple.   Cardiovascular: Normal rate, regular rhythm, normal heart sounds and intact distal pulses. Exam reveals no gallop and no friction rub.   No murmur heard.  Pulmonary/Chest: Effort normal and breath sounds normal. No respiratory distress. She exhibits no tenderness.   Abdominal: Soft. Bowel sounds are normal. There is no tenderness.   Musculoskeletal: Normal range of motion. She exhibits no edema or tenderness.        Cervical back: She exhibits no tenderness.        Thoracic back: She exhibits no tenderness.        Lumbar back: She exhibits no tenderness.   Neurological: She is alert and oriented to person, place, and time.   Skin: Skin is warm and dry. No abrasion, no laceration and no rash noted. She is not diaphoretic.   Psychiatric: She has a normal mood and affect. Her behavior is normal.   Nursing note and vitals reviewed.      ED Course        Procedures             EKG Interpretation:      Interpreted by Aguila Wright  Time reviewed: 0444  Symptoms at time of EKG: left arm tightness   Rhythm: normal sinus   Rate: Normal  Axis: Left Axis Deviation  Ectopy: none  Conduction: left bundle branch block (complete)  ST Segments/ T Waves: No acute ischemic changes  Q Waves:  none  Comparison to prior: Unchanged    Clinical Impression: left bundle branch block                Critical Care time:  none             Labs Ordered and Resulted from Time of ED Arrival Up to the Time of Departure from the ED   BASIC METABOLIC PANEL - Abnormal; Notable for the following components:       Result Value    Calcium 8.3 (*)     All other components within normal limits   CBC WITH PLATELETS DIFFERENTIAL   TROPONIN I   PERIPHERAL IV CATHETER     Medications   lidocaine 1 % 0.1-1 mL (not administered)   lidocaine (LMX4) cream (not administered)   sodium chloride (PF) 0.9% PF flush 3 mL (not administered)   sodium chloride (PF) 0.9% PF flush 3 mL (not administered)   aspirin (ASA) chewable tablet 324 mg (324 mg Oral Given 3/17/19 4486)              Assessments & Plan (with Medical Decision Making)   Arm discomfort on left described as tightness, now is gone. Old left bundle branch block. Will admit to observation unit for cardiac stress imaging and further evaluation. Received aspirin in ED. She is anxious. Risk factors for cardiovascular disease include age, family history, cholesterol elevation and possibly hypertension.    I have reviewed the nursing notes.    I have reviewed the findings, diagnosis, plan and need for follow up with the patient.       Medication List      There are no discharge medications for this visit.         Final diagnoses:   Arm pain, left       3/17/2019   [facility], [place], EMERGENCY DEPARTMENT     Aguila Nick MD  03/17/19 3786

## 2019-03-18 ENCOUNTER — APPOINTMENT (OUTPATIENT)
Dept: NUCLEAR MEDICINE | Facility: CLINIC | Age: 69
End: 2019-03-18
Attending: NURSE PRACTITIONER
Payer: COMMERCIAL

## 2019-03-18 ENCOUNTER — APPOINTMENT (OUTPATIENT)
Dept: CARDIOLOGY | Facility: CLINIC | Age: 69
End: 2019-03-18
Attending: NURSE PRACTITIONER
Payer: COMMERCIAL

## 2019-03-18 VITALS
SYSTOLIC BLOOD PRESSURE: 148 MMHG | WEIGHT: 118 LBS | TEMPERATURE: 99 F | RESPIRATION RATE: 16 BRPM | DIASTOLIC BLOOD PRESSURE: 99 MMHG | HEART RATE: 68 BPM | OXYGEN SATURATION: 100 % | BODY MASS INDEX: 20.25 KG/M2

## 2019-03-18 LAB
CHOLEST SERPL-MCNC: 211 MG/DL
HDLC SERPL-MCNC: 69 MG/DL
INTERPRETATION ECG - MUSE: NORMAL
LDLC SERPL CALC-MCNC: 128 MG/DL
NONHDLC SERPL-MCNC: 142 MG/DL
TRIGL SERPL-MCNC: 70 MG/DL

## 2019-03-18 PROCEDURE — 34300033 ZZH RX 343: Performed by: EMERGENCY MEDICINE

## 2019-03-18 PROCEDURE — 78452 HT MUSCLE IMAGE SPECT MULT: CPT

## 2019-03-18 PROCEDURE — 93016 CV STRESS TEST SUPVJ ONLY: CPT

## 2019-03-18 PROCEDURE — 93017 CV STRESS TEST TRACING ONLY: CPT

## 2019-03-18 PROCEDURE — 36415 COLL VENOUS BLD VENIPUNCTURE: CPT | Performed by: NURSE PRACTITIONER

## 2019-03-18 PROCEDURE — 78452 HT MUSCLE IMAGE SPECT MULT: CPT | Mod: 26

## 2019-03-18 PROCEDURE — A9502 TC99M TETROFOSMIN: HCPCS | Performed by: EMERGENCY MEDICINE

## 2019-03-18 PROCEDURE — 25000128 H RX IP 250 OP 636: Performed by: INTERNAL MEDICINE

## 2019-03-18 PROCEDURE — 93018 CV STRESS TEST I&R ONLY: CPT

## 2019-03-18 PROCEDURE — 99217 ZZC OBSERVATION CARE DISCHARGE: CPT | Mod: Z6 | Performed by: PHYSICIAN ASSISTANT

## 2019-03-18 PROCEDURE — 80061 LIPID PANEL: CPT | Performed by: NURSE PRACTITIONER

## 2019-03-18 PROCEDURE — G0378 HOSPITAL OBSERVATION PER HR: HCPCS

## 2019-03-18 RX ORDER — REGADENOSON 0.08 MG/ML
0.4 INJECTION, SOLUTION INTRAVENOUS ONCE
Status: COMPLETED | OUTPATIENT
Start: 2019-03-18 | End: 2019-03-18

## 2019-03-18 RX ORDER — HYDROXYZINE HYDROCHLORIDE 25 MG/1
25 TABLET, FILM COATED ORAL EVERY 8 HOURS PRN
Qty: 9 TABLET | Refills: 0 | Status: SHIPPED | OUTPATIENT
Start: 2019-03-18 | End: 2019-03-21

## 2019-03-18 RX ADMIN — TETROFOSMIN 11.8 MCI.: 1.38 INJECTION, POWDER, LYOPHILIZED, FOR SOLUTION INTRAVENOUS at 08:29

## 2019-03-18 RX ADMIN — REGADENOSON 0.4 MG: 0.08 INJECTION, SOLUTION INTRAVENOUS at 09:39

## 2019-03-18 RX ADMIN — TETROFOSMIN 35 MCI.: 1.38 INJECTION, POWDER, LYOPHILIZED, FOR SOLUTION INTRAVENOUS at 09:40

## 2019-03-18 NOTE — PLAN OF CARE
Observation goals PRIOR TO DISCHARGE     Comments: List all goals to be met before discharge home:   - Serial troponins and stress test complete.  pending  - Seen and cleared by consultant if applicable no  - Adequate pain control on oral analgesia yes  - Vital signs normal or at patient baseline yes  - Safe disposition plan has been identified yes  - Nurse to notify provider when observation goals have been met and patient is ready for discharge.

## 2019-03-18 NOTE — PROGRESS NOTES
Observation goals PRIOR TO DISCHARGE     Comments: List all goals to be met before discharge home:     - Serial troponins and stress test complete.; No, trops neg x 3.     - Seen and cleared by consultant if applicable: No    - Adequate pain control on oral analgesia; Yes, denies pain, no report of sob and dyspnea reported. Reports tightness in the left arm has resolved.    - Vital signs normal or at patient baseline: Yes,  /86   Pulse 70   Temp 97.8  F (36.6  C) (Oral)   Resp 16   Wt 53.5 kg (118 lb)   SpO2 100%  RA BMI 20.25 kg/m      - Safe disposition plan has been identified: pending. NPO at midnight for Stress Test at 0700. She verbalized understanding.     - Nurse to notify provider when observation goals have been met and patient is ready for discharge.

## 2019-03-18 NOTE — PROGRESS NOTES
- Serial troponins and stress test complete.; No, trops neg x 3.     - Seen and cleared by consultant if applicable: No    - Adequate pain control on oral analgesia; Yes, denies pain, no report of sob and dyspnea reported during ambulation. Reports tightness in the left arm has resolved.    - Vital signs normal or at patient baseline: Yes,/68   Pulse 68   Temp 98.2  F (36.8  C) (Oral)   Resp 16   Wt 53.5 kg (118 lb)   SpO2 98%   BMI 20.25 kg/m      -Safe disposition plan has been identified: pending. NPO as of midnight for NM stress Test at 0700. She verbalized understanding.  Independent in room , voiding without difficulty. Continues to denied cp and sob. Care clustered. In bed, sleeping between cares. will continue to monitor.  - Nurse to notify provider when observation goals have been met and patient is ready for discharge.

## 2019-03-18 NOTE — PROGRESS NOTES
Emergency Medicine Observation Attending note    The patient was independently seen and examined by me. The chart, vital signs, and labs were reviewed. The patient's findings were discussed with the RAINA on the observation unit, and I agree with the findings of the note and the plan.    68-year-old female, admitted to ED observation presenting to the ED with 2 hours of left arm tightness.  This came on while she was in bed.  No chest pain or dyspnea.  No diaphoresis, nausea, vomiting, abdominal pain, back pain.  No fevers, chills, cough.  No leg pain or swelling.  She has no hypertension diagnosis, though had an elevated blood pressure in her recent clinic visit, as well as some mildly elevated cholesterol.  She is a non-smoker.  She does have a family history of coronary disease.  She also has a chronic left bundle branch block.  EKG in the ED showed left bundle branch block.  Troponins have been negative, chest x-ray unremarkable.  Symptoms have completely resolved.   She was admitted for ACS rule out and stress test in the morning.This morning she was down at her stress test, so I was not able to interview her directly.     /68 (BP Location: Right arm)   Pulse 68   Temp 98.4  F (36.9  C) (Oral)   Resp 16   Wt 53.5 kg (118 lb)   SpO2 100%   BMI 20.25 kg/m      No exam by me today    Assessment/plan:  1. Left arm tightness - resolved. No arm pain/tenderness now, and CMS intact.EKG showed LBBB, which is chronic. Nothing to suggest infection, PE/dissection. Will await stress test results, and plan for discharge if neg.    Dictation Disclaimer: Some of this Note has been completed with voice-recognition dictation software. Although errors are generally corrected real-time, there is the potential for a rare error to be present in the completed chart.

## 2019-03-18 NOTE — PROGRESS NOTES
Emergency Medicine Observation Attending note    The patient was independently seen and examined by me. The chart, vital signs, and labs were reviewed. The patient's findings were discussed with the RAINA on the observation unit, and I agree with the findings of the note and the plan.    68-year-old female, admitted to ED observation presenting to the ED with 2 hours of left arm tightness.  This came on while she was in bed.  No chest pain or dyspnea.  No diaphoresis, nausea, vomiting, abdominal pain, back pain.  No fevers, chills, cough.  No leg pain or swelling.  She has no hypertension diagnosis, though had an elevated blood pressure in her recent clinic visit, as well as some mildly elevated cholesterol.  She is a non-smoker.  She does have a family history of coronary disease.  She also has a chronic left bundle branch block.  EKG in the ED showed left bundle branch block.  Troponins have been negative, chest x-ray unremarkable.  Symptoms have completely resolved.   She was admitted for ACS rule out and stress test in the morning.Tonight she has no complaints.     /86   Pulse 70   Temp 97.8  F (36.6  C) (Oral)   Resp 16   Wt 53.5 kg (118 lb)   SpO2 100%   BMI 20.25 kg/m      Exam:  General: awake, alert, NAD  HEENT: NC/AT, oropharynx moist and clear  Neck: supple  Lungs: CTA-B  Heart: RRR, no M/R/G  Abd: soft, ND/NT  Ext: non-tender, no edema, nl rom, CMS intac    Assessment/plan:  1. Left arm tightness - resolved. No arm pain/tenderness now, and CMS intact.EKG showed LBBB, which is chronic. Nothing to suggest infection, PE/dissection. Will continue with plan for rule out tonight and stress in the morning.    Dictation Disclaimer: Some of this Note has been completed with voice-recognition dictation software. Although errors are generally corrected real-time, there is the potential for a rare error to be present in the completed chart.

## 2019-03-18 NOTE — PROGRESS NOTES
Pt here for Lexiscan. Test, medication and side effects reviewed with patient. Lung sounds clear. Denied caffeine use. Tolerated Lexiscan dose without any adverse reactions.  Monitored post injection and then taken back to nuclear medicine for follow up imaging.

## 2019-03-18 NOTE — PROGRESS NOTES
Observation goals PRIOR TO DISCHARGE     Comments: List all goals to be met before discharge home:      - Serial troponins and stress test complete.; No, trops neg x 3.     - Seen and cleared by consultant if applicable: No    - Adequate pain control on oral analgesia; Yes, denies pain, no report of sob and dyspnea reported. Reports tightness in the left arm has resolved.    - Vital signs normal or at patient baseline: Yes,    - Safe disposition plan has been identified: pending. NPO at midnight for Stress Test at 0700. She verbalized understanding.  Independent in room , voiding without difficulty. Continues to denied cp and sob. Care clustered. In bed, will continue to monitor.  - Nurse to notify provider when observation goals have been met and patient is ready for discharge.

## 2019-03-18 NOTE — PROGRESS NOTES
Patient ready for discharge, cleared by provider. PIV removed. This nurse went through all discharge instructions with patient who verbalized understanding. Patient given paper script for Atarax to bring to her pharmacy. Patient has no questions. She left floor with all belongings accompanied by NST to meet  in lobby.

## 2019-07-23 ENCOUNTER — ANCILLARY PROCEDURE (OUTPATIENT)
Dept: MAMMOGRAPHY | Facility: CLINIC | Age: 69
End: 2019-07-23
Attending: FAMILY MEDICINE
Payer: COMMERCIAL

## 2019-07-23 DIAGNOSIS — Z12.39 BREAST SCREENING, UNSPECIFIED: ICD-10-CM

## 2019-07-23 PROCEDURE — 77067 SCR MAMMO BI INCL CAD: CPT

## 2019-09-28 ENCOUNTER — HEALTH MAINTENANCE LETTER (OUTPATIENT)
Age: 69
End: 2019-09-28

## 2020-03-15 ENCOUNTER — HEALTH MAINTENANCE LETTER (OUTPATIENT)
Age: 70
End: 2020-03-15

## 2020-08-19 ENCOUNTER — RECORDS - HEALTHEAST (OUTPATIENT)
Dept: LAB | Facility: CLINIC | Age: 70
End: 2020-08-19

## 2020-08-19 LAB
CHOLEST SERPL-MCNC: 179 MG/DL
FASTING STATUS PATIENT QL REPORTED: NO
HDLC SERPL-MCNC: 63 MG/DL
LDLC SERPL CALC-MCNC: 106 MG/DL
TRIGL SERPL-MCNC: 48 MG/DL

## 2020-09-03 ENCOUNTER — ANCILLARY PROCEDURE (OUTPATIENT)
Dept: MAMMOGRAPHY | Facility: CLINIC | Age: 70
End: 2020-09-03
Attending: FAMILY MEDICINE
Payer: COMMERCIAL

## 2020-09-03 DIAGNOSIS — Z12.31 VISIT FOR SCREENING MAMMOGRAM: ICD-10-CM

## 2020-12-18 ENCOUNTER — RECORDS - HEALTHEAST (OUTPATIENT)
Dept: LAB | Facility: CLINIC | Age: 70
End: 2020-12-18

## 2020-12-18 LAB — TSH SERPL DL<=0.005 MIU/L-ACNC: 2.7 UIU/ML (ref 0.3–5)

## 2021-01-10 ENCOUNTER — HEALTH MAINTENANCE LETTER (OUTPATIENT)
Age: 71
End: 2021-01-10

## 2021-05-07 ENCOUNTER — OFFICE VISIT (OUTPATIENT)
Dept: OPHTHALMOLOGY | Facility: CLINIC | Age: 71
End: 2021-05-07
Payer: COMMERCIAL

## 2021-05-07 DIAGNOSIS — H25.13 SENILE NUCLEAR SCLEROSIS, BILATERAL: Primary | ICD-10-CM

## 2021-05-07 DIAGNOSIS — H52.4 PRESBYOPIA: ICD-10-CM

## 2021-05-07 PROCEDURE — 92004 COMPRE OPH EXAM NEW PT 1/>: CPT | Performed by: OPTOMETRIST

## 2021-05-07 PROCEDURE — 92015 DETERMINE REFRACTIVE STATE: CPT | Performed by: OPTOMETRIST

## 2021-05-07 ASSESSMENT — REFRACTION_MANIFEST
OD_ADD: +2.50
OS_SPHERE: +1.75
OS_AXIS: 145
OS_CYLINDER: +0.75
OS_ADD: +2.50
OD_AXIS: 022
OD_SPHERE: +1.50
OD_CYLINDER: +0.75

## 2021-05-07 ASSESSMENT — REFRACTION_WEARINGRX
OS_SPHERE: +2.00
OS_AXIS: 150
OS_ADD: +2.50
OD_AXIS: 170
OS_CYLINDER: +0.75
OD_SPHERE: +1.75
OD_ADD: +2.50
OD_CYLINDER: +1.50
SPECS_TYPE: PAL

## 2021-05-07 ASSESSMENT — TONOMETRY
OS_IOP_MMHG: 15
IOP_METHOD: ICARE
OD_IOP_MMHG: 13

## 2021-05-07 ASSESSMENT — CUP TO DISC RATIO
OS_RATIO: 0.3
OD_RATIO: 0.3

## 2021-05-07 ASSESSMENT — VISUAL ACUITY
OS_CC+: -2
OD_CC: J2
METHOD: SNELLEN - LINEAR
OS_PH_CC+: -3
OD_CC: 20/30
OD_CC+: -1
CORRECTION_TYPE: GLASSES
OS_CC: J1
OS_CC: 20/25
OS_PH_CC: 20/20

## 2021-05-07 ASSESSMENT — EXTERNAL EXAM - LEFT EYE: OS_EXAM: NORMAL

## 2021-05-07 ASSESSMENT — CONF VISUAL FIELD
OD_NORMAL: 1
METHOD: COUNTING FINGERS
OS_NORMAL: 1

## 2021-05-07 ASSESSMENT — SLIT LAMP EXAM - LIDS
COMMENTS: NORMAL
COMMENTS: NORMAL

## 2021-05-07 ASSESSMENT — EXTERNAL EXAM - RIGHT EYE: OD_EXAM: NORMAL

## 2021-05-07 NOTE — PROGRESS NOTES
Assessment/Plan  (H25.13) Senile nuclear sclerosis, bilateral  (primary encounter diagnosis)  Comment: Leading to mild reduction in acuity and vision quality  Plan: Monitor for now. Depending on future impact on ADL's, could consider cataract surgery.     (H52.4) Presbyopia  Plan: REFRACTION [53923]        SRx updated and released. Monitor for changes every 1-2 years.       Complete documentation of historical and exam elements from today's encounter can  be found in the full encounter summary report (not reduplicated in this progress  note). I personally obtained the chief complaint(s) and history of present illness. I  confirmed and edited as necessary the review of systems, past medical/surgical  history, family history, social history, and examination findings as documented by  others; and I examined the patient myself. I personally reviewed the relevant tests,  images, and reports as documented above. I formulated and edited as necessary the  assessment and plan and discussed the findings and management plan with the  patient and family.    Ernesto Villalba, OD

## 2021-05-07 NOTE — NURSING NOTE
Chief Complaints and History of Present Illnesses   Patient presents with     Annual Eye Exam     Blurry vision OU     Chief Complaint(s) and History of Present Illness(es)     Annual Eye Exam     Laterality: both eyes    Associated symptoms: tearing.  Negative for dryness, eye pain and redness    Treatments tried: no treatments    Pain scale: 0/10    Comments: Blurry vision OU              Comments     Had an exam in [place] in Dec of 2020. Does not feel like she is seeing clearly with them both distance and near. Never felt they were right.OU tear more when tired and with yawning. This has been the case for year. Finds the need to use sunglasses more the past 6 months.     Ana Cristina Marroquin, COT COT 12:57 PM May 7, 2021

## 2021-05-08 ENCOUNTER — HEALTH MAINTENANCE LETTER (OUTPATIENT)
Age: 71
End: 2021-05-08

## 2021-10-14 ENCOUNTER — LAB REQUISITION (OUTPATIENT)
Dept: LAB | Facility: CLINIC | Age: 71
End: 2021-10-14

## 2021-10-14 DIAGNOSIS — Z13.220 ENCOUNTER FOR SCREENING FOR LIPOID DISORDERS: ICD-10-CM

## 2021-10-14 LAB
CHOLEST SERPL-MCNC: 179 MG/DL
HDLC SERPL-MCNC: 66 MG/DL
LDLC SERPL CALC-MCNC: 102 MG/DL
TRIGL SERPL-MCNC: 54 MG/DL

## 2021-10-14 PROCEDURE — 80061 LIPID PANEL: CPT | Performed by: FAMILY MEDICINE

## 2021-10-23 ENCOUNTER — HEALTH MAINTENANCE LETTER (OUTPATIENT)
Age: 71
End: 2021-10-23

## 2022-03-30 ENCOUNTER — OFFICE VISIT (OUTPATIENT)
Dept: OPHTHALMOLOGY | Facility: CLINIC | Age: 72
End: 2022-03-30
Payer: COMMERCIAL

## 2022-03-30 DIAGNOSIS — H52.4 PRESBYOPIA: ICD-10-CM

## 2022-03-30 DIAGNOSIS — H25.13 SENILE NUCLEAR SCLEROSIS, BILATERAL: Primary | ICD-10-CM

## 2022-03-30 PROCEDURE — 92015 DETERMINE REFRACTIVE STATE: CPT | Performed by: OPTOMETRIST

## 2022-03-30 PROCEDURE — 92014 COMPRE OPH EXAM EST PT 1/>: CPT | Performed by: OPTOMETRIST

## 2022-03-30 ASSESSMENT — REFRACTION_MANIFEST
OS_ADD: +2.50
OS_CYLINDER: +0.50
OD_CYLINDER: +0.75
OS_SPHERE: +2.25
OD_ADD: +2.50
OD_SPHERE: +1.50
OS_AXIS: 180
OD_AXIS: 022

## 2022-03-30 ASSESSMENT — REFRACTION_WEARINGRX
OD_AXIS: 022
OS_SPHERE: +1.75
OS_AXIS: 145
OD_SPHERE: +1.50
OD_CYLINDER: +0.75
OS_CYLINDER: +0.75
OS_ADD: +2.50
OD_ADD: +2.50

## 2022-03-30 ASSESSMENT — CONF VISUAL FIELD
OS_NORMAL: 1
OD_NORMAL: 1

## 2022-03-30 ASSESSMENT — VISUAL ACUITY
OD_CC: 20/25
OD_CC+: -3
OD_CC: J1+
OS_CC: 20/25
CORRECTION_TYPE: GLASSES
METHOD: SNELLEN - LINEAR
OS_CC: J1

## 2022-03-30 ASSESSMENT — EXTERNAL EXAM - LEFT EYE: OS_EXAM: NORMAL

## 2022-03-30 ASSESSMENT — TONOMETRY
IOP_METHOD: TONOPEN
OD_IOP_MMHG: 17
OS_IOP_MMHG: 17

## 2022-03-30 ASSESSMENT — SLIT LAMP EXAM - LIDS
COMMENTS: NORMAL
COMMENTS: NORMAL

## 2022-03-30 ASSESSMENT — EXTERNAL EXAM - RIGHT EYE: OD_EXAM: NORMAL

## 2022-03-30 ASSESSMENT — CUP TO DISC RATIO
OD_RATIO: 0.3
OS_RATIO: 0.3

## 2022-03-30 NOTE — PROGRESS NOTES
Assessment/Plan  (H25.13) Senile nuclear sclerosis, bilateral  (primary encounter diagnosis)  Comment: Visually significant- likely at surgical level  Plan: Discussed findings with patient as well as pros and cons of surgery versus waiting. Patient would prefer to monitor for 1 year at this time, which is reasonable with corrected visual acuities still being quite good. Return to clinic with vision changes or if she would like to pursue a cataract evaluation sooner.     (H52.4) Presbyopia  Plan: REFRACTION [[id number]]        Discussed findings with patient. New spectacle prescription dispensed to patient. Patient is welcome to return to clinic with prolonged adaptation difficulties.       Complete documentation of historical and exam elements from today's encounter can  be found in the full encounter summary report (not reduplicated in this progress  note). I personally obtained the chief complaint(s) and history of present illness. I  confirmed and edited as necessary the review of systems, past medical/surgical  history, family history, social history, and examination findings as documented by  others; and I examined the patient myself. I personally reviewed the relevant tests,  images, and reports as documented above. I formulated and edited as necessary the  assessment and plan and discussed the findings and management plan with the  patient and family.    Ernesto Villalba OD

## 2022-04-21 ENCOUNTER — ANCILLARY PROCEDURE (OUTPATIENT)
Dept: MAMMOGRAPHY | Facility: CLINIC | Age: 72
End: 2022-04-21
Attending: FAMILY MEDICINE
Payer: COMMERCIAL

## 2022-04-21 DIAGNOSIS — Z12.31 VISIT FOR SCREENING MAMMOGRAM: ICD-10-CM

## 2022-04-21 PROCEDURE — 77067 SCR MAMMO BI INCL CAD: CPT | Mod: GC

## 2022-04-21 PROCEDURE — 77063 BREAST TOMOSYNTHESIS BI: CPT | Mod: GC

## 2022-06-04 ENCOUNTER — HEALTH MAINTENANCE LETTER (OUTPATIENT)
Age: 72
End: 2022-06-04

## 2022-09-13 ENCOUNTER — LAB REQUISITION (OUTPATIENT)
Dept: LAB | Facility: CLINIC | Age: 72
End: 2022-09-13

## 2022-09-13 DIAGNOSIS — M25.511 PAIN IN RIGHT SHOULDER: ICD-10-CM

## 2022-09-13 LAB — ERYTHROCYTE [SEDIMENTATION RATE] IN BLOOD BY WESTERGREN METHOD: 9 MM/HR (ref 0–30)

## 2022-09-13 PROCEDURE — 85652 RBC SED RATE AUTOMATED: CPT | Performed by: PHYSICIAN ASSISTANT

## 2022-10-10 ENCOUNTER — HEALTH MAINTENANCE LETTER (OUTPATIENT)
Age: 72
End: 2022-10-10

## 2023-03-17 ENCOUNTER — OFFICE VISIT (OUTPATIENT)
Dept: OPHTHALMOLOGY | Facility: CLINIC | Age: 73
End: 2023-03-17
Attending: STUDENT IN AN ORGANIZED HEALTH CARE EDUCATION/TRAINING PROGRAM
Payer: COMMERCIAL

## 2023-03-17 DIAGNOSIS — H25.813 COMBINED FORM OF AGE-RELATED CATARACT, BOTH EYES: Primary | ICD-10-CM

## 2023-03-17 DIAGNOSIS — H52.03 HYPEROPIA OF BOTH EYES WITH ASTIGMATISM AND PRESBYOPIA: ICD-10-CM

## 2023-03-17 DIAGNOSIS — H52.203 HYPEROPIA OF BOTH EYES WITH ASTIGMATISM AND PRESBYOPIA: ICD-10-CM

## 2023-03-17 DIAGNOSIS — H04.123 DRY EYES, BILATERAL: ICD-10-CM

## 2023-03-17 DIAGNOSIS — H52.4 HYPEROPIA OF BOTH EYES WITH ASTIGMATISM AND PRESBYOPIA: ICD-10-CM

## 2023-03-17 PROCEDURE — G0463 HOSPITAL OUTPT CLINIC VISIT: HCPCS | Performed by: STUDENT IN AN ORGANIZED HEALTH CARE EDUCATION/TRAINING PROGRAM

## 2023-03-17 PROCEDURE — 76519 ECHO EXAM OF EYE: CPT | Performed by: STUDENT IN AN ORGANIZED HEALTH CARE EDUCATION/TRAINING PROGRAM

## 2023-03-17 PROCEDURE — 92134 CPTRZ OPH DX IMG PST SGM RTA: CPT | Performed by: STUDENT IN AN ORGANIZED HEALTH CARE EDUCATION/TRAINING PROGRAM

## 2023-03-17 PROCEDURE — 92004 COMPRE OPH EXAM NEW PT 1/>: CPT | Performed by: STUDENT IN AN ORGANIZED HEALTH CARE EDUCATION/TRAINING PROGRAM

## 2023-03-17 PROCEDURE — 92025 CPTRIZED CORNEAL TOPOGRAPHY: CPT | Performed by: STUDENT IN AN ORGANIZED HEALTH CARE EDUCATION/TRAINING PROGRAM

## 2023-03-17 ASSESSMENT — VISUAL ACUITY
METHOD: SNELLEN - LINEAR
OS_BAT_MED: 20/30
OS_CC: 20/30
OD_BAT_MED: 20/40
OS_BAT_HIGH: 20/60
OD_PH_CC: 20/25
OD_PH_CC+: -2
OS_CC+: +2
OD_BAT_LOW: 20/30
OD_CC+: +3
OD_CC: 20/40
CORRECTION_TYPE: GLASSES
OS_BAT_LOW: 20/30
OD_BAT_HIGH: 20/60

## 2023-03-17 ASSESSMENT — CONF VISUAL FIELD
OD_INFERIOR_TEMPORAL_RESTRICTION: 0
OS_SUPERIOR_NASAL_RESTRICTION: 0
OD_NORMAL: 1
OS_SUPERIOR_TEMPORAL_RESTRICTION: 0
OS_INFERIOR_NASAL_RESTRICTION: 0
METHOD: COUNTING FINGERS
OD_SUPERIOR_TEMPORAL_RESTRICTION: 0
OS_NORMAL: 1
OD_SUPERIOR_NASAL_RESTRICTION: 0
OD_INFERIOR_NASAL_RESTRICTION: 0
OS_INFERIOR_TEMPORAL_RESTRICTION: 0

## 2023-03-17 ASSESSMENT — REFRACTION_WEARINGRX
OD_ADD: +2.50
OD_SPHERE: +1.50
OS_SPHERE: +2.25
OS_ADD: +2.50
OS_CYLINDER: +0.50
OS_AXIS: 180
OD_AXIS: 022
OD_CYLINDER: +0.75
SPECS_TYPE: PAL

## 2023-03-17 ASSESSMENT — EXTERNAL EXAM - LEFT EYE: OS_EXAM: NORMAL

## 2023-03-17 ASSESSMENT — REFRACTION_MANIFEST
OS_SPHERE: +2.25
OS_AXIS: 152
OD_AXIS: 030
OD_SPHERE: +1.00
OS_CYLINDER: +0.50
OD_CYLINDER: +0.75

## 2023-03-17 ASSESSMENT — TONOMETRY
IOP_METHOD: TONOPEN
OS_IOP_MMHG: 17
OD_IOP_MMHG: 17

## 2023-03-17 ASSESSMENT — CUP TO DISC RATIO
OS_RATIO: 0.3
OD_RATIO: 0.3

## 2023-03-17 ASSESSMENT — EXTERNAL EXAM - RIGHT EYE: OD_EXAM: NORMAL

## 2023-03-17 NOTE — PROGRESS NOTES
HPI     Follow Up    In both eyes.  This started 12 months ago.  Context:  driving.  Since onset it is gradually worsening.  Associated symptoms include Negative for glare, haloes, dryness and eye pain.  Treatments tried include no treatments.  Pain was noted as 0/10. Additional comments: Senile nuclear sclerosis, bilateral           Comments    She states that last year she changed her glasses but that did not help her vision.  It seems that her distance vision is poor.  She is having trouble reading signs when she is driving.  Her distance vision look fuzzy.    Ro Rothman, COT 9:38 AM  March 17, 2023               Last edited by Ro Rothman COT on 3/17/2023  9:38 AM.          Review of systems for the eyes was negative other than the pertinent positives/negatives listed in the HPI.    Ocular Meds: none    Ocular Hx: refractive error OU    FOHx: father - macular degeneration    PMHx: no Diabetes    SocHx: nonsmoker    Assessment & Plan      Brianna Vallejo is a 72 year old female with the following diagnoses:    1. Combined form of age-related cataract, both eyes    2. Dry eyes, bilateral    3. Hyperopia of both eyes with astigmatism and presbyopia       Combined form of age-related cataract,  both eyes  - visually significant and affecting ADLs, including night driving, less clarity with distance vision  - patient is right eye dominant  - various lens options discussed with patient including premium, toric, and monofocal lenses; patient would like monofocal lenses aimed at plano and understands the possible need for glasses for distance and near vision  - r/b/a of cataract extraction with intraocular lens insertion including blindness, decreased vision, damage to surrounding structures, bleeding, infection, risk of anesthesia, and need for additional surgeries d/w pt, pt expressed understanding and would like to proceed; and informed consent was obtained; also discussed rare guttata noted left eye, may  result in prolonged healing time and need for further surgeries  - preop/postop drops: vigamox/predforte/ketorolac QID each to procedure eye starting two days prior to cataract surgery; will Rx after repeat visit  - patient will see PCP for surgical clearance; no history of MI/Stroke/COPD  - patient to schedule POD#1, POW#1, POM#1 appt; will scheduled left eye first then right eye  - given ocular surface dryness, will have patient return in 1 month for surface check after lubricating eyes and repeat calcs    Special equipment/needs:  Eye: left  Anesthesia:Topical  Dilates to: 8 mm  Iris expansion:  no  Trypan Blue: maybe    Able lay to flat: Yes  Blood Thinner: No  Tamsulosin: No  DM: No  Fuch's/Guttae/PXF: rare guttata OS  LASIK/PRK/SMILE/Eye Surgery/Intravitreal injection: No   Trauma: No     MGD/Dry Eyes OU  - PFAT QID and prn OU  - artificial tear gel or ointment at bedtime OU  - warm compresses BID OU  5-10 min each time    Hyperopia of both eyes with astigmatism and presbyopia  - refraction not dispensed today, will refract after cataract surgery    Patient disposition:   Return in about 4 weeks (around 4/14/2023) for Follow Up, VT, argos, pentacam, or sooner changes.      Attending Physician Attestation:  Complete documentation of historical and exam elements from today's encounter can be found in the full encounter summary report (not reduplicated in this progress note).  I personally obtained the chief complaint(s) and history of present illness.  I confirmed and edited as necessary the review of systems, past medical/surgical history, family history, social history, and examination findings as documented by others; and I examined the patient myself.  I personally reviewed the relevant tests, images, and reports as documented above.  I formulated and edited as necessary the assessment and plan and discussed the findings and management plan with the patient and family. Today with Brianna Vallejo, I reviewed  the indications, risks, benefits, and alternatives of the proposed surgical procedure including, but not limited to, failure obtain the desired result  and need for additional surgery, bleeding, infection, loss of vision, loss of the eye, and the remote possibility of permanent damage to any organ system or death with the use of anesthesia.  I provided multiple opportunities for the questions, answered all questions to the best of my ability, and confirmed that my answers and my discussion were understood. - Jade Oscar MD

## 2023-03-17 NOTE — PATIENT INSTRUCTIONS
Use preservative free artificial tears one drop four times a day and as needed for comfort to both eyes; some brands include: refresh, systane, thera tears, blink, etc    Use artificial tear gel or ointment, one small amount to both eyes at bedtime; some brands include: genteal gel tears, refresh PM, systane nighttime ointment, etc    DO NOT use eye drops that say 'take the red out' including Visine or Clear Eyes    Do warm compresses at least two times a day to both eyelids for 5-10 min each time

## 2023-03-17 NOTE — NURSING NOTE
Chief Complaints and History of Present Illnesses   Patient presents with     Follow Up     Senile nuclear sclerosis, bilateral     Chief Complaint(s) and History of Present Illness(es)     Follow Up            Laterality: both eyes    Onset: 12 months ago    Context: driving    Course: gradually worsening    Associated symptoms: Negative for glare, haloes, dryness and eye pain    Treatments tried: no treatments    Pain scale: 0/10    Comments: Senile nuclear sclerosis, bilateral          Comments    She states that last year she changed her glasses but that did not help her vision.  It seems that her distance vision is poor.  She is having trouble reading signs when she is driving.  Her distance vision look fuzzy.    Ro Rothman, COT 9:38 AM  March 17, 2023

## 2023-03-19 ASSESSMENT — SLIT LAMP EXAM - LIDS
COMMENTS: MGD
COMMENTS: MGD

## 2023-03-22 ENCOUNTER — TELEPHONE (OUTPATIENT)
Dept: OPHTHALMOLOGY | Facility: CLINIC | Age: 73
End: 2023-03-22
Payer: COMMERCIAL

## 2023-03-24 ENCOUNTER — TELEPHONE (OUTPATIENT)
Dept: OPHTHALMOLOGY | Facility: CLINIC | Age: 73
End: 2023-03-24
Payer: COMMERCIAL

## 2023-03-27 ENCOUNTER — TELEPHONE (OUTPATIENT)
Dept: OPHTHALMOLOGY | Facility: CLINIC | Age: 73
End: 2023-03-27
Payer: COMMERCIAL

## 2023-03-27 PROBLEM — H25.813 COMBINED FORM OF AGE-RELATED CATARACT, BOTH EYES: Status: ACTIVE | Noted: 2023-03-27

## 2023-03-27 NOTE — TELEPHONE ENCOUNTER
Called patient to schedule surgery with Dr. Oscar    Date of Surgery: 5/2,5/16    Location of surgery: [facility]    Pre-Op H&P: PCP    Post-Op Appt Date: 5/3,5/10,5/17,5/24,5/31,6/14    Surgery Packet Mailed: yes    Additional comments: Spoke with patient, they are aware of above date, will review packet and reach out with any questions           Anna C. Schoenecker on 3/27/2023 at 3:33 PM

## 2023-03-28 ENCOUNTER — LAB REQUISITION (OUTPATIENT)
Dept: LAB | Facility: CLINIC | Age: 73
End: 2023-03-28

## 2023-03-28 DIAGNOSIS — R00.2 PALPITATIONS: ICD-10-CM

## 2023-03-28 LAB
ANION GAP SERPL CALCULATED.3IONS-SCNC: 11 MMOL/L (ref 7–15)
BUN SERPL-MCNC: 12.7 MG/DL (ref 8–23)
CALCIUM SERPL-MCNC: 9.7 MG/DL (ref 8.8–10.2)
CHLORIDE SERPL-SCNC: 103 MMOL/L (ref 98–107)
CREAT SERPL-MCNC: 0.73 MG/DL (ref 0.51–0.95)
DEPRECATED HCO3 PLAS-SCNC: 28 MMOL/L (ref 22–29)
GFR SERPL CREATININE-BSD FRML MDRD: 86 ML/MIN/1.73M2
GLUCOSE SERPL-MCNC: 86 MG/DL (ref 70–99)
POTASSIUM SERPL-SCNC: 4.9 MMOL/L (ref 3.4–5.3)
SODIUM SERPL-SCNC: 142 MMOL/L (ref 136–145)
TSH SERPL DL<=0.005 MIU/L-ACNC: 2.53 UIU/ML (ref 0.3–4.2)

## 2023-03-28 PROCEDURE — 80048 BASIC METABOLIC PNL TOTAL CA: CPT | Performed by: STUDENT IN AN ORGANIZED HEALTH CARE EDUCATION/TRAINING PROGRAM

## 2023-03-28 PROCEDURE — 84443 ASSAY THYROID STIM HORMONE: CPT | Performed by: STUDENT IN AN ORGANIZED HEALTH CARE EDUCATION/TRAINING PROGRAM

## 2023-04-17 ENCOUNTER — OFFICE VISIT (OUTPATIENT)
Dept: OPHTHALMOLOGY | Facility: CLINIC | Age: 73
End: 2023-04-17
Attending: STUDENT IN AN ORGANIZED HEALTH CARE EDUCATION/TRAINING PROGRAM
Payer: COMMERCIAL

## 2023-04-17 DIAGNOSIS — H52.203 HYPEROPIA OF BOTH EYES WITH ASTIGMATISM AND PRESBYOPIA: ICD-10-CM

## 2023-04-17 DIAGNOSIS — H52.03 HYPEROPIA OF BOTH EYES WITH ASTIGMATISM AND PRESBYOPIA: ICD-10-CM

## 2023-04-17 DIAGNOSIS — H04.123 DRY EYES, BILATERAL: ICD-10-CM

## 2023-04-17 DIAGNOSIS — H25.813 COMBINED FORM OF AGE-RELATED CATARACT, BOTH EYES: Primary | ICD-10-CM

## 2023-04-17 DIAGNOSIS — H52.4 HYPEROPIA OF BOTH EYES WITH ASTIGMATISM AND PRESBYOPIA: ICD-10-CM

## 2023-04-17 PROCEDURE — G0463 HOSPITAL OUTPT CLINIC VISIT: HCPCS | Performed by: STUDENT IN AN ORGANIZED HEALTH CARE EDUCATION/TRAINING PROGRAM

## 2023-04-17 PROCEDURE — 99213 OFFICE O/P EST LOW 20 MIN: CPT | Performed by: STUDENT IN AN ORGANIZED HEALTH CARE EDUCATION/TRAINING PROGRAM

## 2023-04-17 RX ORDER — PREDNISOLONE ACETATE 10 MG/ML
1 SUSPENSION/ DROPS OPHTHALMIC 4 TIMES DAILY
Qty: 5 ML | Refills: 0 | Status: SHIPPED | OUTPATIENT
Start: 2023-04-17 | End: 2024-06-14

## 2023-04-17 RX ORDER — KETOROLAC TROMETHAMINE 5 MG/ML
1 SOLUTION OPHTHALMIC 4 TIMES DAILY
Qty: 5 ML | Refills: 0 | Status: SHIPPED | OUTPATIENT
Start: 2023-04-17 | End: 2024-06-14

## 2023-04-17 RX ORDER — MOXIFLOXACIN 5 MG/ML
1 SOLUTION/ DROPS OPHTHALMIC 4 TIMES DAILY
Qty: 3 ML | Refills: 0 | Status: SHIPPED | OUTPATIENT
Start: 2023-04-17 | End: 2024-06-14

## 2023-04-17 ASSESSMENT — TONOMETRY
OD_IOP_MMHG: 9
OS_IOP_MMHG: 9
IOP_METHOD: TONOPEN

## 2023-04-17 ASSESSMENT — CONF VISUAL FIELD
OD_INFERIOR_TEMPORAL_RESTRICTION: 0
OS_INFERIOR_TEMPORAL_RESTRICTION: 0
OS_SUPERIOR_NASAL_RESTRICTION: 0
OD_NORMAL: 1
OD_SUPERIOR_TEMPORAL_RESTRICTION: 0
OS_INFERIOR_NASAL_RESTRICTION: 0
OS_SUPERIOR_TEMPORAL_RESTRICTION: 0
METHOD: COUNTING FINGERS
OD_SUPERIOR_NASAL_RESTRICTION: 0
OS_NORMAL: 1
OD_INFERIOR_NASAL_RESTRICTION: 0

## 2023-04-17 ASSESSMENT — VISUAL ACUITY
OD_CC+: -1
OD_PH_CC: 20/25
OS_CC+: +1
OD_PH_CC+: -3
OS_CC: 20/30
METHOD: SNELLEN - LINEAR
OD_CC: 20/30

## 2023-04-17 ASSESSMENT — REFRACTION_WEARINGRX
SPECS_TYPE: PAL
OS_ADD: +2.50
OD_AXIS: 022
OD_CYLINDER: +0.75
OS_CYLINDER: +0.50
OD_ADD: +2.50
OS_AXIS: 180
OD_SPHERE: +1.50
OS_SPHERE: +2.25

## 2023-04-17 ASSESSMENT — SLIT LAMP EXAM - LIDS
COMMENTS: MGD
COMMENTS: MGD

## 2023-04-17 ASSESSMENT — EXTERNAL EXAM - LEFT EYE: OS_EXAM: NORMAL

## 2023-04-17 ASSESSMENT — EXTERNAL EXAM - RIGHT EYE: OD_EXAM: NORMAL

## 2023-04-17 NOTE — PROGRESS NOTES
HPI     Cataract Follow Up    In both eyes.  Associated symptoms include Negative for blurred vision and double vision.  Onset was gradual.  Frequency is constant.  Pain was noted as 0/10.           Comments    Brianna is here for re-evaluation of cataracts prior to surgery. Her eyes were very dry last visit, and she has been using gtts as directed since last visit.    Scott Smith COT 10:13 AM April 17, 2023             Last edited by Scott Smith on 4/17/2023 10:13 AM.          Review of systems for the eyes was negative other than the pertinent positives/negatives listed in the HPI.    Ocular Meds: none    Ocular Hx: refractive error OU    FOHx: father - macular degeneration    PMHx: no Diabetes    SocHx: nonsmoker    Assessment & Plan      Brianna Vallejo is a 72 year old female with the following diagnoses:    1. Combined form of age-related cataract, both eyes    2. Dry eyes, bilateral    3. Hyperopia of both eyes with astigmatism and presbyopia       Combined form of age-related cataract,  both eyes  - visually significant and affecting ADLs, including night driving, less clarity with distance vision  - patient is right eye dominant  - various lens options discussed with patient including premium, toric, and monofocal lenses; patient would like monofocal lenses aimed at plano and understands the possible need for glasses for distance and near vision  - r/b/a of cataract extraction with intraocular lens insertion including blindness, decreased vision, damage to surrounding structures, bleeding, infection, risk of anesthesia, and need for additional surgeries d/w pt, pt expressed understanding and would like to proceed; and informed consent was obtained; also discussed rare guttata noted left eye, may result in prolonged healing time and need for further surgeries  - preop/postop drops: vigamox/predforte/ketorolac QID each to procedure eye starting two days prior to cataract surgery; Rx'ed today  - patient will  see PCP for surgical clearance; no history of MI/Stroke/COPD  - patient scheduled for surgery already  - IOL calcs reviewed and sufficient for cataract surgery    Special equipment/needs:  Eye: left  Anesthesia:Topical  Dilates to: 8 mm  Iris expansion:  no  Trypan Blue: maybe    Able lay to flat: Yes  Blood Thinner: No  Tamsulosin: No  DM: No  Fuch's/Guttae/PXF: rare guttata OS  LASIK/PRK/SMILE/Eye Surgery/Intravitreal injection: No   Trauma: No     MGD/Dry Eyes OU  - improved ocular surface today  - PFATs QID and prn OU  - artificial tear gel or ointment at bedtime OU  - warm compresses BID OU  5-10 min each time    Hyperopia of both eyes with astigmatism and presbyopia  - refraction not dispensed, will refract after cataract surgery    Patient disposition:   Return for Follow Up for post op cataract surgery visit, or sooner changes.      Attending Physician Attestation:  Complete documentation of historical and exam elements from today's encounter can be found in the full encounter summary report (not reduplicated in this progress note).  I personally obtained the chief complaint(s) and history of present illness.  I confirmed and edited as necessary the review of systems, past medical/surgical history, family history, social history, and examination findings as documented by others; and I examined the patient myself.  I personally reviewed the relevant tests, images, and reports as documented above.  I formulated and edited as necessary the assessment and plan and discussed the findings and management plan with the patient and family. Today with Brianna Vallejo, I reviewed the indications, risks, benefits, and alternatives of the proposed surgical procedure including, but not limited to, failure obtain the desired result  and need for additional surgery, bleeding, infection, loss of vision, loss of the eye, and the remote possibility of permanent damage to any organ system or death with the use of anesthesia.  I  provided multiple opportunities for the questions, answered all questions to the best of my ability, and confirmed that my answers and my discussion were understood. - Jade Oscar MD

## 2023-04-17 NOTE — NURSING NOTE
Chief Complaints and History of Present Illnesses   Patient presents with     Cataract Follow Up     Chief Complaint(s) and History of Present Illness(es)     Cataract Follow Up            Laterality: both eyes    Associated symptoms: Negative for blurred vision and double vision    Onset: gradual    Frequency: constant    Pain scale: 0/10          Comments    Brianna is here for re-evaluation of cataracts prior to surgery. Her eyes were very dry last visit, and she has been using gtts as directed since last visit.    Scott Smith COT 10:13 AM April 17, 2023

## 2023-04-26 ENCOUNTER — TRANSFERRED RECORDS (OUTPATIENT)
Dept: HEALTH INFORMATION MANAGEMENT | Facility: CLINIC | Age: 73
End: 2023-04-26
Payer: COMMERCIAL

## 2023-04-27 ENCOUNTER — TELEPHONE (OUTPATIENT)
Dept: OPHTHALMOLOGY | Facility: CLINIC | Age: 73
End: 2023-04-27
Payer: COMMERCIAL

## 2023-05-01 ENCOUNTER — ANESTHESIA EVENT (OUTPATIENT)
Dept: SURGERY | Facility: AMBULATORY SURGERY CENTER | Age: 73
End: 2023-05-01
Payer: COMMERCIAL

## 2023-05-02 ENCOUNTER — ANESTHESIA (OUTPATIENT)
Dept: SURGERY | Facility: AMBULATORY SURGERY CENTER | Age: 73
End: 2023-05-02
Payer: COMMERCIAL

## 2023-05-02 ENCOUNTER — HOSPITAL ENCOUNTER (OUTPATIENT)
Facility: AMBULATORY SURGERY CENTER | Age: 73
Discharge: HOME | End: 2023-05-02
Attending: STUDENT IN AN ORGANIZED HEALTH CARE EDUCATION/TRAINING PROGRAM
Payer: COMMERCIAL

## 2023-05-02 VITALS
SYSTOLIC BLOOD PRESSURE: 154 MMHG | WEIGHT: 120 LBS | RESPIRATION RATE: 14 BRPM | BODY MASS INDEX: 20.49 KG/M2 | HEART RATE: 70 BPM | HEIGHT: 64 IN | TEMPERATURE: 97.4 F | DIASTOLIC BLOOD PRESSURE: 80 MMHG | OXYGEN SATURATION: 100 %

## 2023-05-02 DIAGNOSIS — H25.813 COMBINED FORM OF AGE-RELATED CATARACT, BOTH EYES: ICD-10-CM

## 2023-05-02 PROCEDURE — 66984 XCAPSL CTRC RMVL W/O ECP: CPT | Mod: LT | Performed by: STUDENT IN AN ORGANIZED HEALTH CARE EDUCATION/TRAINING PROGRAM

## 2023-05-02 PROCEDURE — 66984 XCAPSL CTRC RMVL W/O ECP: CPT | Mod: LT

## 2023-05-02 DEVICE — LENS CC60WF 23.5 CLAREON UV ASPHERIC BICONVEX IOL: Type: IMPLANTABLE DEVICE | Site: EYE | Status: FUNCTIONAL

## 2023-05-02 RX ORDER — ACETAMINOPHEN 325 MG/1
975 TABLET ORAL ONCE
Status: COMPLETED | OUTPATIENT
Start: 2023-05-02 | End: 2023-05-02

## 2023-05-02 RX ORDER — TIMOLOL MALEATE 5 MG/ML
SOLUTION/ DROPS OPHTHALMIC PRN
Status: DISCONTINUED | OUTPATIENT
Start: 2023-05-02 | End: 2023-05-02 | Stop reason: HOSPADM

## 2023-05-02 RX ORDER — SODIUM CHLORIDE, SODIUM LACTATE, POTASSIUM CHLORIDE, CALCIUM CHLORIDE 600; 310; 30; 20 MG/100ML; MG/100ML; MG/100ML; MG/100ML
INJECTION, SOLUTION INTRAVENOUS CONTINUOUS
Status: DISCONTINUED | OUTPATIENT
Start: 2023-05-02 | End: 2023-05-02 | Stop reason: HOSPADM

## 2023-05-02 RX ORDER — LIDOCAINE 40 MG/G
CREAM TOPICAL
Status: DISCONTINUED | OUTPATIENT
Start: 2023-05-02 | End: 2023-05-03 | Stop reason: HOSPADM

## 2023-05-02 RX ORDER — HYDROMORPHONE HYDROCHLORIDE 1 MG/ML
0.2 INJECTION, SOLUTION INTRAMUSCULAR; INTRAVENOUS; SUBCUTANEOUS EVERY 5 MIN PRN
Status: DISCONTINUED | OUTPATIENT
Start: 2023-05-02 | End: 2023-05-02 | Stop reason: HOSPADM

## 2023-05-02 RX ORDER — BALANCED SALT SOLUTION 6.4; .75; .48; .3; 3.9; 1.7 MG/ML; MG/ML; MG/ML; MG/ML; MG/ML; MG/ML
SOLUTION OPHTHALMIC PRN
Status: DISCONTINUED | OUTPATIENT
Start: 2023-05-02 | End: 2023-05-02 | Stop reason: HOSPADM

## 2023-05-02 RX ORDER — CYCLOPENTOLAT/TROPIC/PHENYLEPH 1%-1%-2.5%
1 DROPS (EA) OPHTHALMIC (EYE)
Status: COMPLETED | OUTPATIENT
Start: 2023-05-02 | End: 2023-05-02

## 2023-05-02 RX ORDER — TETRACAINE HYDROCHLORIDE 5 MG/ML
SOLUTION OPHTHALMIC PRN
Status: DISCONTINUED | OUTPATIENT
Start: 2023-05-02 | End: 2023-05-02 | Stop reason: HOSPADM

## 2023-05-02 RX ORDER — FENTANYL CITRATE 50 UG/ML
INJECTION, SOLUTION INTRAMUSCULAR; INTRAVENOUS PRN
Status: DISCONTINUED | OUTPATIENT
Start: 2023-05-02 | End: 2023-05-02

## 2023-05-02 RX ORDER — MOXIFLOXACIN IN NACL,ISO-OS/PF 0.3MG/0.3
SYRINGE (ML) INTRAOCULAR PRN
Status: DISCONTINUED | OUTPATIENT
Start: 2023-05-02 | End: 2023-05-02 | Stop reason: HOSPADM

## 2023-05-02 RX ORDER — DEXAMETHASONE SODIUM PHOSPHATE 4 MG/ML
INJECTION, SOLUTION INTRA-ARTICULAR; INTRALESIONAL; INTRAMUSCULAR; INTRAVENOUS; SOFT TISSUE PRN
Status: DISCONTINUED | OUTPATIENT
Start: 2023-05-02 | End: 2023-05-02 | Stop reason: HOSPADM

## 2023-05-02 RX ORDER — SODIUM CHLORIDE, SODIUM LACTATE, POTASSIUM CHLORIDE, CALCIUM CHLORIDE 600; 310; 30; 20 MG/100ML; MG/100ML; MG/100ML; MG/100ML
INJECTION, SOLUTION INTRAVENOUS CONTINUOUS
Status: DISCONTINUED | OUTPATIENT
Start: 2023-05-02 | End: 2023-05-03 | Stop reason: HOSPADM

## 2023-05-02 RX ORDER — FENTANYL CITRATE 50 UG/ML
50 INJECTION, SOLUTION INTRAMUSCULAR; INTRAVENOUS EVERY 5 MIN PRN
Status: DISCONTINUED | OUTPATIENT
Start: 2023-05-02 | End: 2023-05-02 | Stop reason: HOSPADM

## 2023-05-02 RX ORDER — PROPARACAINE HYDROCHLORIDE 5 MG/ML
1 SOLUTION/ DROPS OPHTHALMIC ONCE
Status: COMPLETED | OUTPATIENT
Start: 2023-05-02 | End: 2023-05-02

## 2023-05-02 RX ORDER — LIDOCAINE HYDROCHLORIDE 10 MG/ML
INJECTION, SOLUTION EPIDURAL; INFILTRATION; INTRACAUDAL; PERINEURAL PRN
Status: DISCONTINUED | OUTPATIENT
Start: 2023-05-02 | End: 2023-05-02 | Stop reason: HOSPADM

## 2023-05-02 RX ORDER — ONDANSETRON 2 MG/ML
4 INJECTION INTRAMUSCULAR; INTRAVENOUS EVERY 30 MIN PRN
Status: DISCONTINUED | OUTPATIENT
Start: 2023-05-02 | End: 2023-05-02 | Stop reason: HOSPADM

## 2023-05-02 RX ORDER — IBUPROFEN 200 MG
CAPSULE ORAL
COMMUNITY

## 2023-05-02 RX ORDER — ONDANSETRON 4 MG/1
4 TABLET, ORALLY DISINTEGRATING ORAL EVERY 30 MIN PRN
Status: DISCONTINUED | OUTPATIENT
Start: 2023-05-02 | End: 2023-05-02 | Stop reason: HOSPADM

## 2023-05-02 RX ORDER — FENTANYL CITRATE 50 UG/ML
25 INJECTION, SOLUTION INTRAMUSCULAR; INTRAVENOUS EVERY 5 MIN PRN
Status: DISCONTINUED | OUTPATIENT
Start: 2023-05-02 | End: 2023-05-02 | Stop reason: HOSPADM

## 2023-05-02 RX ORDER — LORATADINE 10 MG/1
TABLET ORAL
COMMUNITY

## 2023-05-02 RX ORDER — MOXIFLOXACIN 5 MG/ML
1 SOLUTION/ DROPS OPHTHALMIC
Status: COMPLETED | OUTPATIENT
Start: 2023-05-02 | End: 2023-05-02

## 2023-05-02 RX ORDER — HYDROMORPHONE HYDROCHLORIDE 1 MG/ML
0.4 INJECTION, SOLUTION INTRAMUSCULAR; INTRAVENOUS; SUBCUTANEOUS EVERY 5 MIN PRN
Status: DISCONTINUED | OUTPATIENT
Start: 2023-05-02 | End: 2023-05-02 | Stop reason: HOSPADM

## 2023-05-02 RX ORDER — OXYCODONE HYDROCHLORIDE 5 MG/1
5 TABLET ORAL
Status: DISCONTINUED | OUTPATIENT
Start: 2023-05-02 | End: 2023-05-03 | Stop reason: HOSPADM

## 2023-05-02 RX ADMIN — PROPARACAINE HYDROCHLORIDE 1 DROP: 5 SOLUTION/ DROPS OPHTHALMIC at 11:10

## 2023-05-02 RX ADMIN — ACETAMINOPHEN 975 MG: 325 TABLET ORAL at 11:13

## 2023-05-02 RX ADMIN — MOXIFLOXACIN 1 DROP: 5 SOLUTION/ DROPS OPHTHALMIC at 11:23

## 2023-05-02 RX ADMIN — FENTANYL CITRATE 50 MCG: 50 INJECTION, SOLUTION INTRAMUSCULAR; INTRAVENOUS at 11:58

## 2023-05-02 RX ADMIN — Medication 1 DROP: at 11:18

## 2023-05-02 RX ADMIN — MOXIFLOXACIN 1 DROP: 5 SOLUTION/ DROPS OPHTHALMIC at 11:18

## 2023-05-02 RX ADMIN — Medication 1 DROP: at 11:13

## 2023-05-02 RX ADMIN — SODIUM CHLORIDE, SODIUM LACTATE, POTASSIUM CHLORIDE, CALCIUM CHLORIDE: 600; 310; 30; 20 INJECTION, SOLUTION INTRAVENOUS at 11:14

## 2023-05-02 RX ADMIN — MOXIFLOXACIN 1 DROP: 5 SOLUTION/ DROPS OPHTHALMIC at 11:12

## 2023-05-02 RX ADMIN — Medication 1 DROP: at 11:23

## 2023-05-02 NOTE — DISCHARGE INSTRUCTIONS
"Cataract Surgery Post-Operative Instructions      You have undergone cataract surgery today. Take it easy as the mild anesthesia wears off.     After cataract surgery you will have an eye shield over your eye and things might be a little blurry. This is normal. It will clear up over the coming days. Your eye may feel a little scratchy and this should get better over the next few days    It is okay to resume your previous diet    Use Tylenol (if there is no contraindication from a medical standpoint) if you experience any eye pain    Avoid sleeping on or putting pressure on the operated eye    Sleep with the eye shield at bedtime for the first week    You may resume light activity tomorrow, but no heavy lifting, no straining, no bending over especially the first week    Do not rub the eyes, no water in the eyes (no pools or hot tubs or tap water); Please wait until tomorrow to shower, and when you do shower take care not to get water in the surgical eye    You can continue the following eye drops starting tonight:    Vigamox (tan cap) 1 drop four times a day to surgical eye for 1 week, then stop  Ketorolac (grey top) 1 drop four times a day to surgical eye for 1 week, then 1 drop three times a day to surgical eye for 1 week, then 1 drop two times a day to surgical eye for 1 week , then 1 drop once a day to surgical eye for 1 week, then stop  Predforte (pink or white top) 1 drop four times a day to surgical eye for 1 week, then 1 drop three times a day to surgical eye for 1 week, then 1 drop two times a day to surgical eye for 1 week , then 1 drop once a day to surgical eye for 1 week, then stop  OPTIONAL: preservative free artificial tears (refresh, thera tears, systane, etc) 1 drop 4-6 times per day as needed (DO NOT use Visine or Clear Eyes or any eye drop product that states \"red out\")  If on glaucoma medications, then continue regular eye pressure drops  **wait 5-10 minutes between each drop**  **can refrigerate " eye drops to reduce burning or stinging sensation**    Please contact Dr Oscar at [phone] if any issues arise    Your follow up appointment is as scheduled on 5/3/2023 at 10:30 AM    [facility] - Department of Ophthalmology  [address] [place], [place], [address]          [facility]  Home Care Following Anesthesia  For 24 hours after surgery:  Get plenty of rest.  A responsible adult must stay with you for at least 24 hours after you leave the surgery center.  Do not drive or use heavy equipment.  If you have weakness or tingling, don't drive or use heavy equipment until this feeling goes away.   Do not drink alcohol.   Avoid strenuous or risky activities.  Ask for help when climbing stairs.  You may feel lightheaded.  IF so, sit for a few minutes before standing.  Have someone help you get up.   If you have nausea (feel sick to your stomach): Drink only clear liquids such as apple juice, ginger ale, broth or 7-Up.  Rest may also help.  Be sure to drink enough fluids.  Move to a regular diet as you feel able.   You may have a slight fever.  Call the doctor if your fever is over 100 F (37.7 C) (taken under the tongue) or lasts longer than 24 hours.  You may have a dry mouth, a sore throat, muscle aches or trouble sleeping. These should go away after 24 hours.  Do not make important or legal decisions.   It is recommended to avoid smoking.               Tips for taking pain medications  To get the best pain relief possible, remember these points:  Take pain medications as directed, before pain becomes severe.  Pain medication can upset your stomach: taking it with food may help.  Constipation is a common side effect of pain medication. Drink plenty of  fluids.  Eat foods high in fiber. Take a stool softener if recommended by your doctor or pharmacist.  Do not drink alcohol, drive or operate machinery while taking pain medications.  Ask about  other ways to control pain, such as with heat, ice or relaxation.    Tylenol/Acetaminophen Consumption  To help encourage the safe use of acetaminophen, the makers of TYLENOL  have lowered the maximum daily dose for single-ingredient Extra Strength TYLENOL  (acetaminophen) products sold in the [place] from 8 pills per day (4,000 mg) to 6 pills per day (3,000 mg). The dosing interval has also changed from 2 pills every 4-6 hours to 2 pills every 6 hours.  If you feel your pain relief is insufficient, you may take Tylenol/Acetaminophen in addition to your narcotic pain medication.   Be careful not to exceed 3,000 mg of Tylenol/Acetaminophen in a 24 hour period from all sources.  If you are taking extra strength Tylenol/acetaminophen (500 mg), the maximum dose is 6 tablets in 24 hours.  If you are taking regular strength acetaminophen (325 mg), the maximum dose is 9 tablets in 24 hours.    Call a doctor for any of the following:  Signs of infection (fever, growing tenderness at the surgery site, a large amount of drainage or bleeding, severe pain, foul-smelling drainage, redness, swelling).  It has been over 8 to 10 hours since surgery and you are still not able to urinate (pass water).  Headache for over 24 hours.  Numbness, tingling or weakness the day after surgery (if you had spinal anesthesia).  Signs of Covid-19 infection (temperature over 100 degrees, shortness of breath, cough, loss of taste/smell, generalized body aches, persistent headache, chills, sore throat, nausea/vomiting/diarrhea)  Your doctor is:       Dr. Jade Oscar, Ophthalmology: [phone]               Or dial [phone] and ask for the resident on call for:  Ophthalmology  For emergency care, call the:  [facility]:  [phone] (TTY for hearing impaired: [phone])

## 2023-05-02 NOTE — BRIEF OP NOTE
[facility] Brief Operative Note    Pre-operative diagnosis: Combined form of age-related cataract, left eye   Post-operative diagnosis Combined form of age-related cataract, left eye   Procedure: Procedure(s):  LEFT EYE PHACOEMULSIFICATION, CATARACT, WITH INTRAOCULAR LENS IMPLANT   Surgeon(s): Surgeon(s) and Role:     * Jade Oscar MD - Primary   Estimated blood loss: * No values recorded between 5/2/2023 12:10 PM and 5/2/2023 12:30 PM *    Specimens: * No specimens in log *   Findings: Left eye cataract

## 2023-05-02 NOTE — ANESTHESIA CARE TRANSFER NOTE
Patient: Brianna Vallejo    Procedure: Procedure(s):  LEFT EYE PHACOEMULSIFICATION, CATARACT, WITH INTRAOCULAR LENS IMPLANT       Diagnosis: Combined form of age-related cataract, both eyes [H25.813]  Diagnosis Additional Information: No value filed.    Anesthesia Type:   MAC     Note:    Oropharynx: oropharynx clear of all foreign objects  Level of Consciousness: awake  Oxygen Supplementation: room air    Independent Airway: airway patency satisfactory and stable  Dentition: dentition unchanged  Vital Signs Stable: post-procedure vital signs reviewed and stable    Patient transferred to: Phase II  Comments: VSS and WNL, comfortable, no PONV, report to Ellyn MORRIS  Handoff Report: Identifed the Patient, Identified the Reponsible Provider, Reviewed the pertinent medical history, Discussed the surgical course, Reviewed Intra-OP anesthesia mangement and issues during anesthesia, Set expectations for post-procedure period and Allowed opportunity for questions and acknowledgement of understanding      Vitals:  Vitals Value Taken Time   /78 05/02/23 1234   Temp 36.3  C (97.4  F) 05/02/23 1234   Pulse 68 05/02/23 1234   Resp 16 05/02/23 1234   SpO2 100 % 05/02/23 1234       Electronically Signed By: THOMAS Jackson CRNA  May 2, 2023  1:00 PM

## 2023-05-02 NOTE — ANESTHESIA POSTPROCEDURE EVALUATION
Patient: Brianna Vallejo    Procedure: Procedure(s):  LEFT EYE PHACOEMULSIFICATION, CATARACT, WITH INTRAOCULAR LENS IMPLANT       Anesthesia Type:  MAC    Note:  Anesthesia Post Evaluation    Last vitals:  Vitals:    05/02/23 1050   BP: 139/87   Pulse: 73   Resp: 18   Temp: 36.3  C (97.4  F)   SpO2: 98%       Electronically Signed By: Grupo Rubin MD, MD  May 2, 2023  12:10 PM

## 2023-05-02 NOTE — ANESTHESIA POSTPROCEDURE EVALUATION
Patient: Brianna Vallejo    Procedure: Procedure(s):  LEFT EYE PHACOEMULSIFICATION, CATARACT, WITH INTRAOCULAR LENS IMPLANT       Anesthesia Type:  MAC    Note:  Disposition: Outpatient   Postop Pain Control: Uneventful            Sign Out: Well controlled pain   PONV: No   Neuro/Psych: Uneventful            Sign Out: Acceptable/Baseline neuro status   Airway/Respiratory: Uneventful            Sign Out: Acceptable/Baseline resp. status   CV/Hemodynamics: Uneventful            Sign Out: Acceptable CV status; No obvious hypovolemia; No obvious fluid overload   Other NRE: NONE   DID A NON-ROUTINE EVENT OCCUR? No           Last vitals:  Vitals Value Taken Time   /80 05/02/23 1250   Temp 36.3  C (97.4  F) 05/02/23 1250   Pulse 70 05/02/23 1250   Resp 14 05/02/23 1250   SpO2 100 % 05/02/23 1250       Electronically Signed By: Grupo Rubin MD, MD  May 2, 2023  2:12 PM

## 2023-05-02 NOTE — ANESTHESIA PREPROCEDURE EVALUATION
Anesthesia Pre-Procedure Evaluation    Patient: Brianna Vallejo   MRN: [id number] : 1950        Procedure : Procedure(s):  LEFT EYE PHACOEMULSIFICATION, CATARACT, WITH INTRAOCULAR LENS IMPLANT          Past Medical History:   Diagnosis Date     Anxiety disorder      Chemical dependency (H)     in recovery     Genital herpes 1990s    ,      Gout      Insomnia      LBP (low back pain)      Nonsenile cataract      Osteopoikilosis      RBBB     neg cardiac workup      Ulcerative colitis 1967      Past Surgical History:   Procedure Laterality Date     LARYNX SURGERY  3/4/10      No Known Allergies   Social History     Tobacco Use     Smoking status: Former     Packs/day: 1.00     Years: 6.00     Pack years: 6.00     Types: Cigarettes     Quit date: 1974     Years since quittin.7     Smokeless tobacco: Never   Vaping Use     Vaping status: Not on file   Substance Use Topics     Alcohol use: No      Wt Readings from Last 1 Encounters:   23 54.4 kg (120 lb)        Anesthesia Evaluation            ROS/MED HX  ENT/Pulmonary:  - neg pulmonary ROS     Neurologic:  - neg neurologic ROS     Cardiovascular:  - neg cardiovascular ROS     METS/Exercise Tolerance:     Hematologic:  - neg hematologic  ROS     Musculoskeletal:       GI/Hepatic:  - neg GI/hepatic ROS     Renal/Genitourinary:  - neg Renal ROS     Endo:  - neg endo ROS     Psychiatric/Substance Use:  - neg psychiatric ROS     Infectious Disease:  - neg infectious disease ROS     Malignancy:  - neg malignancy ROS     Other:               OUTSIDE LABS:  CBC:   Lab Results   Component Value Date    WBC 4.1 2019    WBC 4.7 2017    HGB 13.3 2019    HGB 12.8 2017    HCT 40.7 2019    HCT 38.9 2017     2019     2017     BMP:   Lab Results   Component Value Date     2023     2019    POTASSIUM 4.9 2023    POTASSIUM 3.7 2019    CHLORIDE 103 2023     CHLORIDE 108 03/17/2019    CO2 28 03/28/2023    CO2 28 03/17/2019    BUN 12.7 03/28/2023    BUN 14 03/17/2019    CR 0.73 03/28/2023    CR 0.73 03/17/2019    GLC 86 03/28/2023    GLC 88 03/17/2019     COAGS:   Lab Results   Component Value Date    PTT 27 09/08/2017    INR 0.98 03/02/2007     POC:   Lab Results   Component Value Date    BGM 89 09/08/2017     HEPATIC:   Lab Results   Component Value Date    ALBUMIN 4.1 03/02/2007    PROTTOTAL 7.0 03/02/2007    ALT 41 03/02/2007    AST 29 03/02/2007    ALKPHOS 90 03/02/2007    BILITOTAL 0.2 03/02/2007     OTHER:   Lab Results   Component Value Date    DARNELL 9.7 03/28/2023    TSH 2.53 03/28/2023    SED 9 09/13/2022       Anesthesia Plan    ASA Status:  2      Anesthesia Type: MAC.     - Reason for MAC: immobility needed, straight local not clinically adequate              Consents    Anesthesia Plan(s) and associated risks, benefits, and realistic alternatives discussed. Questions answered and patient/representative(s) expressed understanding.     - Discussed: Risks, Benefits and Alternatives for BOTH SEDATION and the PROCEDURE were discussed     - Discussed with:  Patient      - Extended Intubation/Ventilatory Support Discussed: No.      - Patient is DNR/DNI Status: No    Use of blood products discussed: No .     Postoperative Care    Pain management: IV analgesics, Oral pain medications.   PONV prophylaxis: Ondansetron (or other 5HT-3), Dexamethasone or Solumedrol     Comments:           H&P reviewed: Unable to attach H&P to encounter due to EHR limitations. H&P Update: appropriate H&P reviewed, patient examined. No interval changes since H&P (within 30 days).         Grupo Rubin MD, MD

## 2023-05-03 ENCOUNTER — OFFICE VISIT (OUTPATIENT)
Dept: OPHTHALMOLOGY | Facility: CLINIC | Age: 73
End: 2023-05-03
Attending: STUDENT IN AN ORGANIZED HEALTH CARE EDUCATION/TRAINING PROGRAM
Payer: COMMERCIAL

## 2023-05-03 DIAGNOSIS — H25.811 COMBINED FORM OF AGE-RELATED CATARACT, RIGHT EYE: ICD-10-CM

## 2023-05-03 DIAGNOSIS — Z96.1 PSEUDOPHAKIA, LEFT EYE: Primary | ICD-10-CM

## 2023-05-03 DIAGNOSIS — Z98.890 POSTOPERATIVE EYE STATE: ICD-10-CM

## 2023-05-03 PROCEDURE — 99024 POSTOP FOLLOW-UP VISIT: CPT | Performed by: STUDENT IN AN ORGANIZED HEALTH CARE EDUCATION/TRAINING PROGRAM

## 2023-05-03 PROCEDURE — G0463 HOSPITAL OUTPT CLINIC VISIT: HCPCS | Performed by: STUDENT IN AN ORGANIZED HEALTH CARE EDUCATION/TRAINING PROGRAM

## 2023-05-03 ASSESSMENT — VISUAL ACUITY
METHOD: SNELLEN - LINEAR
OD_CC+: -1
OS_SC+: -1
OS_SC: 20/30
OD_CC: 20/30

## 2023-05-03 ASSESSMENT — TONOMETRY
OS_IOP_MMHG: 15
OD_IOP_MMHG: 17
IOP_METHOD: TONOPEN

## 2023-05-03 ASSESSMENT — CONF VISUAL FIELD
OS_INFERIOR_NASAL_RESTRICTION: 0
OS_INFERIOR_TEMPORAL_RESTRICTION: 0
OS_SUPERIOR_NASAL_RESTRICTION: 0
OS_SUPERIOR_TEMPORAL_RESTRICTION: 0
OD_INFERIOR_TEMPORAL_RESTRICTION: 0
OD_INFERIOR_NASAL_RESTRICTION: 0
OD_SUPERIOR_NASAL_RESTRICTION: 0
OD_SUPERIOR_TEMPORAL_RESTRICTION: 0

## 2023-05-03 ASSESSMENT — EXTERNAL EXAM - LEFT EYE: OS_EXAM: NORMAL

## 2023-05-03 ASSESSMENT — SLIT LAMP EXAM - LIDS
COMMENTS: MGD
COMMENTS: MGD

## 2023-05-03 ASSESSMENT — EXTERNAL EXAM - RIGHT EYE: OD_EXAM: NORMAL

## 2023-05-03 NOTE — NURSING NOTE
Chief Complaints and History of Present Illnesses   Patient presents with     Post Op (Ophthalmology) Left Eye     Chief Complaint(s) and History of Present Illness(es)     Post Op (Ophthalmology) Left Eye            Laterality: left eye          Comments    Brianna is here one day post LE cataract surgery with IOL implant. She Says last night LE felt scratchy. Today no discomfort. She feels LE vision is good.     Scott Smith COT 11:13 AM May 3, 2023

## 2023-05-03 NOTE — PROGRESS NOTES
HPI     Post Op (Ophthalmology) Left Eye    In left eye.           Comments    Brianna is here one day post LE cataract surgery with IOL implant. She Says last night LE felt scratchy. Today no discomfort. She feels LE vision is good.     Scott Smith COT 11:13 AM May 3, 2023             Last edited by Scott Smith on 5/3/2023 11:14 AM.          Review of systems for the eyes was negative other than the pertinent positives/negatives listed in the HPI.    Ocular Meds: postop drops as instructed    Ocular Hx: refractive error OU; s/p CE/IOL left eye (5/2/23)    FOHx: father - macular degeneration    PMHx: no Diabetes    SocHx: nonsmoker    Assessment & Plan      Brianna Vallejo is a 72 year old female with the following diagnoses:    1. Pseudophakia, left eye    2. Postoperative eye state    3. Combined form of age-related cataract, right eye       Assessment & Plan     Postoperative eye state  Pseudophakia left eye   - s/p CE/IOL left eye (5/2/23)  - Doing well, IOP wnl, happy with results  - Keep patch in place at night for 7 days  - Start post-operative drops and taper according to instructions  -- vigamox QID left eye x 1 week  -- predforte/ketorolac QID/TID/BID/daily left eye; taper weekly  - Post-operative do's and don'ts reviewed, questions answered  - Counseled endophthalmitis/RD/return precautions    Combined form of age-related cataract,  Right eye  - already scheduled for surgery    MGD/Dry Eyes OU  - PFATs QID and prn OU  - hold off on warm compresses BID OU  5-10 min each time until POM1 visit after cataract surgery    Hyperopia of both eyes with astigmatism and presbyopia  - refraction not dispensed, will refract after cataract surgery    Patient disposition:   No follow-ups on file.      Attending Physician Attestation:  Complete documentation of historical and exam elements from today's encounter can be found in the full encounter summary report (not reduplicated in this progress note).  I personally  obtained the chief complaint(s) and history of present illness.  I confirmed and edited as necessary the review of systems, past medical/surgical history, family history, social history, and examination findings as documented by others; and I examined the patient myself.  I personally reviewed the relevant tests, images, and reports as documented above.  I formulated and edited as necessary the assessment and plan and discussed the findings and management plan with the patient and family. Today with Brianna Vallejo, I reviewed the indications, risks, benefits, and alternatives of the proposed surgical procedure including, but not limited to, failure obtain the desired result  and need for additional surgery, bleeding, infection, loss of vision, loss of the eye, and the remote possibility of permanent damage to any organ system or death with the use of anesthesia.  I provided multiple opportunities for the questions, answered all questions to the best of my ability, and confirmed that my answers and my discussion were understood. - Jade Oscar MD

## 2023-05-10 ENCOUNTER — OFFICE VISIT (OUTPATIENT)
Dept: OPHTHALMOLOGY | Facility: CLINIC | Age: 73
End: 2023-05-10
Attending: STUDENT IN AN ORGANIZED HEALTH CARE EDUCATION/TRAINING PROGRAM
Payer: COMMERCIAL

## 2023-05-10 DIAGNOSIS — Z98.890 POSTOPERATIVE EYE STATE: ICD-10-CM

## 2023-05-10 DIAGNOSIS — H04.123 DRY EYES, BILATERAL: ICD-10-CM

## 2023-05-10 DIAGNOSIS — H52.7 REFRACTIVE ERROR: ICD-10-CM

## 2023-05-10 DIAGNOSIS — H25.811 COMBINED FORM OF AGE-RELATED CATARACT, RIGHT EYE: ICD-10-CM

## 2023-05-10 DIAGNOSIS — Z96.1 PSEUDOPHAKIA, LEFT EYE: Primary | ICD-10-CM

## 2023-05-10 PROCEDURE — G0463 HOSPITAL OUTPT CLINIC VISIT: HCPCS | Performed by: STUDENT IN AN ORGANIZED HEALTH CARE EDUCATION/TRAINING PROGRAM

## 2023-05-10 PROCEDURE — 99024 POSTOP FOLLOW-UP VISIT: CPT | Performed by: STUDENT IN AN ORGANIZED HEALTH CARE EDUCATION/TRAINING PROGRAM

## 2023-05-10 RX ORDER — PREDNISOLONE ACETATE 10 MG/ML
1 SUSPENSION/ DROPS OPHTHALMIC 4 TIMES DAILY
Qty: 5 ML | Refills: 0 | Status: SHIPPED | OUTPATIENT
Start: 2023-05-10 | End: 2024-06-14

## 2023-05-10 RX ORDER — KETOROLAC TROMETHAMINE 5 MG/ML
1 SOLUTION OPHTHALMIC 4 TIMES DAILY
Qty: 5 ML | Refills: 0 | Status: SHIPPED | OUTPATIENT
Start: 2023-05-10 | End: 2024-06-14

## 2023-05-10 RX ORDER — MOXIFLOXACIN 5 MG/ML
1 SOLUTION/ DROPS OPHTHALMIC 4 TIMES DAILY
Qty: 3 ML | Refills: 0 | Status: SHIPPED | OUTPATIENT
Start: 2023-05-10 | End: 2024-06-14

## 2023-05-10 ASSESSMENT — REFRACTION_WEARINGRX
OS_AXIS: 180
OD_ADD: +2.50
OD_AXIS: 022
OS_ADD: +2.50
OS_CYLINDER: +0.50
OD_SPHERE: +1.50
OS_SPHERE: +2.25
SPECS_TYPE: PAL
OD_CYLINDER: +0.75

## 2023-05-10 ASSESSMENT — EXTERNAL EXAM - LEFT EYE: OS_EXAM: NORMAL

## 2023-05-10 ASSESSMENT — REFRACTION_MANIFEST
OS_AXIS: 124
OS_CYLINDER: +0.75
OS_SPHERE: -1.25

## 2023-05-10 ASSESSMENT — VISUAL ACUITY
METHOD: SNELLEN - LINEAR
CORRECTION_TYPE: GLASSES
OD_CC: 20/30-2/+
OS_SC: 20/20-2

## 2023-05-10 ASSESSMENT — TONOMETRY
OS_IOP_MMHG: 14
IOP_METHOD: ICARE
OD_IOP_MMHG: 14

## 2023-05-10 ASSESSMENT — EXTERNAL EXAM - RIGHT EYE: OD_EXAM: NORMAL

## 2023-05-10 ASSESSMENT — SLIT LAMP EXAM - LIDS
COMMENTS: MGD
COMMENTS: MGD

## 2023-05-10 NOTE — PROGRESS NOTES
HPI     Post Op (Ophthalmology) Left Eye    Pain was noted as 0/10.           Comments    S/p left eye CE/IOL 5/2/23  Using drops as directed, no concerns    LANRE Barney 11:08 AM 05/10/2023              Last edited by Megan Hercules on 5/10/2023 11:08 AM.          Review of systems for the eyes was negative other than the pertinent positives/negatives listed in the HPI.    Ocular Meds: postop drops as instructed    Ocular Hx: refractive error OU; s/p CE/IOL left eye (5/2/23)    FOHx: father - macular degeneration    PMHx: no Diabetes    SocHx: nonsmoker    Assessment & Plan      Brianna Vallejo is a 72 year old female with the following diagnoses:    1. Pseudophakia, left eye    2. Postoperative eye state    3. Combined form of age-related cataract, right eye    4. Dry eyes, bilateral    5. Refractive error         Postoperative eye state  Pseudophakia left eye   - s/p CE/IOL left eye (5/2/23)  - Doing well, IOP wnl, happy with results, no signs of infection  - discontinue eye shield  - Start post-operative drops and taper according to instructions  -- continue vigamox QID left eye x until seen during postop visit next week due to small epi defect near main woud  -- predforte/ketorolac TID/BID/daily left eye; taper weekly  - Post-operative do's and don'ts reviewed, questions answered  - Counseled endophthalmitis/RD/return precautions    Combined form of age-related cataract,  Right eye  - already scheduled for surgery, patient would like to have standard monofocal IOL aimed at plano  - Rx sent to pharmacy for ketorolac/predforte/vigamox QID OD each starting two days prior to cataract surgery    MGD/Dry Eyes OU  - PFATs QID and prn OU  - hold off on warm compresses BID OU  5-10 min each time until POM1 visit after cataract surgery    Refractive error  - refraction not dispensed, will refract after cataract surgery    Patient disposition:   Return for Follow Up for post op cataract surgery visit, or  sooner changes.      Attending Physician Attestation:  Complete documentation of historical and exam elements from today's encounter can be found in the full encounter summary report (not reduplicated in this progress note).  I personally obtained the chief complaint(s) and history of present illness.  I confirmed and edited as necessary the review of systems, past medical/surgical history, family history, social history, and examination findings as documented by others; and I examined the patient myself.  I personally reviewed the relevant tests, images, and reports as documented above.  I formulated and edited as necessary the assessment and plan and discussed the findings and management plan with the patient and family. Today with Brianna Vallejo, I reviewed the indications, risks, benefits, and alternatives of the proposed surgical procedure including, but not limited to, failure obtain the desired result  and need for additional surgery, bleeding, infection, loss of vision, loss of the eye, and the remote possibility of permanent damage to any organ system or death with the use of anesthesia.  I provided multiple opportunities for the questions, answered all questions to the best of my ability, and confirmed that my answers and my discussion were understood. - Jade Oscar MD

## 2023-05-12 ENCOUNTER — LAB REQUISITION (OUTPATIENT)
Dept: LAB | Facility: CLINIC | Age: 73
End: 2023-05-12
Payer: COMMERCIAL

## 2023-05-12 DIAGNOSIS — J02.9 ACUTE PHARYNGITIS, UNSPECIFIED: ICD-10-CM

## 2023-05-12 PROCEDURE — 87635 SARS-COV-2 COVID-19 AMP PRB: CPT | Performed by: FAMILY MEDICINE

## 2023-05-12 PROCEDURE — 87635 SARS-COV-2 COVID-19 AMP PRB: CPT | Mod: ORL | Performed by: FAMILY MEDICINE

## 2023-05-13 LAB — SARS-COV-2 RNA RESP QL NAA+PROBE: NEGATIVE

## 2023-05-15 ENCOUNTER — TELEPHONE (OUTPATIENT)
Dept: OPHTHALMOLOGY | Facility: CLINIC | Age: 73
End: 2023-05-15
Payer: COMMERCIAL

## 2023-05-15 ENCOUNTER — ANESTHESIA EVENT (OUTPATIENT)
Dept: SURGERY | Facility: AMBULATORY SURGERY CENTER | Age: 73
End: 2023-05-15
Payer: COMMERCIAL

## 2023-05-16 ENCOUNTER — HOSPITAL ENCOUNTER (OUTPATIENT)
Facility: AMBULATORY SURGERY CENTER | Age: 73
Discharge: HOME | End: 2023-05-16
Attending: STUDENT IN AN ORGANIZED HEALTH CARE EDUCATION/TRAINING PROGRAM
Payer: COMMERCIAL

## 2023-05-16 ENCOUNTER — ANESTHESIA (OUTPATIENT)
Dept: SURGERY | Facility: AMBULATORY SURGERY CENTER | Age: 73
End: 2023-05-16
Payer: COMMERCIAL

## 2023-05-16 VITALS
DIASTOLIC BLOOD PRESSURE: 88 MMHG | RESPIRATION RATE: 17 BRPM | HEIGHT: 64 IN | SYSTOLIC BLOOD PRESSURE: 145 MMHG | OXYGEN SATURATION: 99 % | BODY MASS INDEX: 20.49 KG/M2 | WEIGHT: 120 LBS | TEMPERATURE: 98 F | HEART RATE: 71 BPM

## 2023-05-16 DIAGNOSIS — H25.813 COMBINED FORM OF AGE-RELATED CATARACT, BOTH EYES: Primary | ICD-10-CM

## 2023-05-16 PROCEDURE — 66984 XCAPSL CTRC RMVL W/O ECP: CPT | Mod: RT

## 2023-05-16 PROCEDURE — 66984 XCAPSL CTRC RMVL W/O ECP: CPT | Mod: 79 | Performed by: STUDENT IN AN ORGANIZED HEALTH CARE EDUCATION/TRAINING PROGRAM

## 2023-05-16 DEVICE — LENS CC60WF 23.0 CLAREON UV ASPHERIC BICONVEX IOL: Type: IMPLANTABLE DEVICE | Site: EYE | Status: FUNCTIONAL

## 2023-05-16 RX ORDER — ONDANSETRON 4 MG/1
4 TABLET, ORALLY DISINTEGRATING ORAL EVERY 30 MIN PRN
Status: DISCONTINUED | OUTPATIENT
Start: 2023-05-16 | End: 2023-05-17 | Stop reason: HOSPADM

## 2023-05-16 RX ORDER — ONDANSETRON 2 MG/ML
4 INJECTION INTRAMUSCULAR; INTRAVENOUS EVERY 30 MIN PRN
Status: DISCONTINUED | OUTPATIENT
Start: 2023-05-16 | End: 2023-05-17 | Stop reason: HOSPADM

## 2023-05-16 RX ORDER — TIMOLOL MALEATE 5 MG/ML
SOLUTION/ DROPS OPHTHALMIC PRN
Status: DISCONTINUED | OUTPATIENT
Start: 2023-05-16 | End: 2023-05-16 | Stop reason: HOSPADM

## 2023-05-16 RX ORDER — MOXIFLOXACIN IN NACL,ISO-OS/PF 0.3MG/0.3
SYRINGE (ML) INTRAOCULAR PRN
Status: DISCONTINUED | OUTPATIENT
Start: 2023-05-16 | End: 2023-05-16 | Stop reason: HOSPADM

## 2023-05-16 RX ORDER — HYDROMORPHONE HYDROCHLORIDE 1 MG/ML
0.4 INJECTION, SOLUTION INTRAMUSCULAR; INTRAVENOUS; SUBCUTANEOUS EVERY 5 MIN PRN
Status: DISCONTINUED | OUTPATIENT
Start: 2023-05-16 | End: 2023-05-17 | Stop reason: HOSPADM

## 2023-05-16 RX ORDER — DEXAMETHASONE SODIUM PHOSPHATE 4 MG/ML
INJECTION, SOLUTION INTRA-ARTICULAR; INTRALESIONAL; INTRAMUSCULAR; INTRAVENOUS; SOFT TISSUE PRN
Status: DISCONTINUED | OUTPATIENT
Start: 2023-05-16 | End: 2023-05-16 | Stop reason: HOSPADM

## 2023-05-16 RX ORDER — PROPARACAINE HYDROCHLORIDE 5 MG/ML
1 SOLUTION/ DROPS OPHTHALMIC ONCE
Status: COMPLETED | OUTPATIENT
Start: 2023-05-16 | End: 2023-05-16

## 2023-05-16 RX ORDER — SODIUM CHLORIDE, SODIUM LACTATE, POTASSIUM CHLORIDE, CALCIUM CHLORIDE 600; 310; 30; 20 MG/100ML; MG/100ML; MG/100ML; MG/100ML
INJECTION, SOLUTION INTRAVENOUS CONTINUOUS
Status: DISCONTINUED | OUTPATIENT
Start: 2023-05-16 | End: 2023-05-17 | Stop reason: HOSPADM

## 2023-05-16 RX ORDER — MOXIFLOXACIN 5 MG/ML
1 SOLUTION/ DROPS OPHTHALMIC
Status: COMPLETED | OUTPATIENT
Start: 2023-05-16 | End: 2023-05-16

## 2023-05-16 RX ORDER — LIDOCAINE HYDROCHLORIDE 10 MG/ML
INJECTION, SOLUTION EPIDURAL; INFILTRATION; INTRACAUDAL; PERINEURAL PRN
Status: DISCONTINUED | OUTPATIENT
Start: 2023-05-16 | End: 2023-05-16 | Stop reason: HOSPADM

## 2023-05-16 RX ORDER — CYCLOPENTOLAT/TROPIC/PHENYLEPH 1%-1%-2.5%
1 DROPS (EA) OPHTHALMIC (EYE)
Status: COMPLETED | OUTPATIENT
Start: 2023-05-16 | End: 2023-05-16

## 2023-05-16 RX ORDER — FENTANYL CITRATE 50 UG/ML
50 INJECTION, SOLUTION INTRAMUSCULAR; INTRAVENOUS EVERY 5 MIN PRN
Status: DISCONTINUED | OUTPATIENT
Start: 2023-05-16 | End: 2023-05-17 | Stop reason: HOSPADM

## 2023-05-16 RX ORDER — LIDOCAINE 40 MG/G
CREAM TOPICAL
Status: DISCONTINUED | OUTPATIENT
Start: 2023-05-16 | End: 2023-05-16 | Stop reason: HOSPADM

## 2023-05-16 RX ORDER — BALANCED SALT SOLUTION 6.4; .75; .48; .3; 3.9; 1.7 MG/ML; MG/ML; MG/ML; MG/ML; MG/ML; MG/ML
SOLUTION OPHTHALMIC PRN
Status: DISCONTINUED | OUTPATIENT
Start: 2023-05-16 | End: 2023-05-16 | Stop reason: HOSPADM

## 2023-05-16 RX ORDER — SODIUM CHLORIDE, SODIUM LACTATE, POTASSIUM CHLORIDE, CALCIUM CHLORIDE 600; 310; 30; 20 MG/100ML; MG/100ML; MG/100ML; MG/100ML
INJECTION, SOLUTION INTRAVENOUS CONTINUOUS
Status: CANCELLED | OUTPATIENT
Start: 2023-05-16

## 2023-05-16 RX ORDER — TETRACAINE HYDROCHLORIDE 5 MG/ML
SOLUTION OPHTHALMIC PRN
Status: DISCONTINUED | OUTPATIENT
Start: 2023-05-16 | End: 2023-05-16 | Stop reason: HOSPADM

## 2023-05-16 RX ORDER — SODIUM CHLORIDE, SODIUM LACTATE, POTASSIUM CHLORIDE, CALCIUM CHLORIDE 600; 310; 30; 20 MG/100ML; MG/100ML; MG/100ML; MG/100ML
INJECTION, SOLUTION INTRAVENOUS CONTINUOUS
Status: DISCONTINUED | OUTPATIENT
Start: 2023-05-16 | End: 2023-05-16 | Stop reason: HOSPADM

## 2023-05-16 RX ORDER — FENTANYL CITRATE 50 UG/ML
25 INJECTION, SOLUTION INTRAMUSCULAR; INTRAVENOUS EVERY 5 MIN PRN
Status: DISCONTINUED | OUTPATIENT
Start: 2023-05-16 | End: 2023-05-17 | Stop reason: HOSPADM

## 2023-05-16 RX ORDER — HYDROMORPHONE HYDROCHLORIDE 1 MG/ML
0.2 INJECTION, SOLUTION INTRAMUSCULAR; INTRAVENOUS; SUBCUTANEOUS EVERY 5 MIN PRN
Status: DISCONTINUED | OUTPATIENT
Start: 2023-05-16 | End: 2023-05-17 | Stop reason: HOSPADM

## 2023-05-16 RX ORDER — FENTANYL CITRATE 50 UG/ML
INJECTION, SOLUTION INTRAMUSCULAR; INTRAVENOUS PRN
Status: DISCONTINUED | OUTPATIENT
Start: 2023-05-16 | End: 2023-05-16

## 2023-05-16 RX ADMIN — Medication 1 DROP: at 10:52

## 2023-05-16 RX ADMIN — SODIUM CHLORIDE, SODIUM LACTATE, POTASSIUM CHLORIDE, CALCIUM CHLORIDE: 600; 310; 30; 20 INJECTION, SOLUTION INTRAVENOUS at 11:10

## 2023-05-16 RX ADMIN — MOXIFLOXACIN 1 DROP: 5 SOLUTION/ DROPS OPHTHALMIC at 10:52

## 2023-05-16 RX ADMIN — Medication 1 DROP: at 10:57

## 2023-05-16 RX ADMIN — PROPARACAINE HYDROCHLORIDE 1 DROP: 5 SOLUTION/ DROPS OPHTHALMIC at 10:51

## 2023-05-16 RX ADMIN — FENTANYL CITRATE 50 MCG: 50 INJECTION, SOLUTION INTRAMUSCULAR; INTRAVENOUS at 13:06

## 2023-05-16 RX ADMIN — Medication 1 DROP: at 11:02

## 2023-05-16 RX ADMIN — MOXIFLOXACIN 1 DROP: 5 SOLUTION/ DROPS OPHTHALMIC at 10:57

## 2023-05-16 RX ADMIN — MOXIFLOXACIN 1 DROP: 5 SOLUTION/ DROPS OPHTHALMIC at 11:02

## 2023-05-16 NOTE — BRIEF OP NOTE
[facility] Brief Operative Note    Pre-operative diagnosis: Combined form of age-related cataract, right eye   Post-operative diagnosis Combined form of age-related cataract, right eye   Procedure: Procedure(s):  RIGHT EYE PHACOEMULSIFICATION, CATARACT, WITH INTRAOCULAR LENS IMPLANT   Surgeon(s): Surgeon(s) and Role:     * Jade Oscar MD - Primary   Estimated blood loss: * No values recorded between 5/16/2023  1:13 PM and 5/16/2023  1:33 PM *    Specimens: * No specimens in log *   Findings: Right eye cataract

## 2023-05-16 NOTE — TELEPHONE ENCOUNTER
Returned patient's phone call. States she is at the tail end of her illness. No fevers, chills, night sweats. Mild congestions. Discussed with patient that we can postpone her surgery, but if she feels better tomorrow, she can proceed with surgery as long as she is not coughing as I explained to patient that this can result in significant complications during surgery. Patient expressed understanding and says she will call to cancel if any changes and no improvement tomorrow.

## 2023-05-16 NOTE — DISCHARGE INSTRUCTIONS
"Cataract Surgery Post-Operative Instructions      You have undergone cataract surgery today. Take it easy as the mild anesthesia wears off.     After cataract surgery you will have an eye shield over your eye and things might be a little blurry. This is normal. It will clear up over the coming days. Your eye may feel a little scratchy and this should get better over the next few days    It is okay to resume your previous diet    Use Tylenol (if there is no contraindication from a medical standpoint) if you experience any eye pain    Avoid sleeping on or putting pressure on the operated eye    Sleep with the eye shield at bedtime for the first week    You may resume light activity tomorrow, but no heavy lifting, no straining, no bending over especially the first week    Do not rub the eyes, no water in the eyes (no pools or hot tubs or tap water); Please wait until tomorrow to shower, and when you do shower take care not to get water in the surgical eye    You can continue the following eye drops starting tonight:    Vigamox (tan cap) 1 drop four times a day to surgical eye for 1 week, then stop  Ketorolac (grey top) 1 drop four times a day to surgical eye for 1 week, then 1 drop three times a day to surgical eye for 1 week, then 1 drop two times a day to surgical eye for 1 week , then 1 drop once a day to surgical eye for 1 week, then stop  Predforte (pink or white top) 1 drop four times a day to surgical eye for 1 week, then 1 drop three times a day to surgical eye for 1 week, then 1 drop two times a day to surgical eye for 1 week , then 1 drop once a day to surgical eye for 1 week, then stop  OPTIONAL: preservative free artificial tears (refresh, thera tears, systane, etc) 1 drop 4-6 times per day as needed (DO NOT use Visine or Clear Eyes or any eye drop product that states \"red out\")  If on glaucoma medications, then continue regular eye pressure drops  **wait 5-10 minutes between each drop**  **can refrigerate " eye drops to reduce burning or stinging sensation**    Please contact Dr Oscar at [phone] if any issues arise    Your follow up appointment is as scheduled on 5/17/2023 at 10:30 AM    [facility] - Department of Ophthalmology  [address], [place], [address]    [facility]  Home Care Following Anesthesia  For 24 hours after surgery:  Get plenty of rest.  A responsible adult must stay with you for at least 24 hours after you leave the surgery center.  Do not drive or use heavy equipment.  If you have weakness or tingling, don't drive or use heavy equipment until this feeling goes away.   Do not drink alcohol.   Avoid strenuous or risky activities.  Ask for help when climbing stairs.  You may feel lightheaded.  IF so, sit for a few minutes before standing.  Have someone help you get up.   If you have nausea (feel sick to your stomach): Drink only clear liquids such as apple juice, ginger ale, broth or 7-Up.  Rest may also help.  Be sure to drink enough fluids.  Move to a regular diet as you feel able.   You may have a slight fever.  Call the doctor if your fever is over 100 F (37.7 C) (taken under the tongue) or lasts longer than 24 hours.  You may have a dry mouth, a sore throat, muscle aches or trouble sleeping. These should go away after 24 hours.  Do not make important or legal decisions.   It is recommended to avoid smoking.               Tips for taking pain medications  To get the best pain relief possible, remember these points:  Take pain medications as directed, before pain becomes severe.  Pain medication can upset your stomach: taking it with food may help.  Constipation is a common side effect of pain medication. Drink plenty of  fluids.  Eat foods high in fiber. Take a stool softener if recommended by your doctor or pharmacist.  Do not drink alcohol, drive or operate machinery while taking pain medications.  Ask about other  ways to control pain, such as with heat, ice or relaxation.    Tylenol/Acetaminophen Consumption  To help encourage the safe use of acetaminophen, the makers of TYLENOL  have lowered the maximum daily dose for single-ingredient Extra Strength TYLENOL  (acetaminophen) products sold in the [place] from 8 pills per day (4,000 mg) to 6 pills per day (3,000 mg). The dosing interval has also changed from 2 pills every 4-6 hours to 2 pills every 6 hours.  If you feel your pain relief is insufficient, you may take Tylenol/Acetaminophen in addition to your narcotic pain medication.   Be careful not to exceed 3,000 mg of Tylenol/Acetaminophen in a 24 hour period from all sources.  If you are taking extra strength Tylenol/acetaminophen (500 mg), the maximum dose is 6 tablets in 24 hours.  If you are taking regular strength acetaminophen (325 mg), the maximum dose is 9 tablets in 24 hours.    Call a doctor for any of the following:  Signs of infection (fever, growing tenderness at the surgery site, a large amount of drainage or bleeding, severe pain, foul-smelling drainage, redness, swelling).  It has been over 8 to 10 hours since surgery and you are still not able to urinate (pass water).  Headache for over 24 hours.  Numbness, tingling or weakness the day after surgery (if you had spinal anesthesia).  Signs of Covid-19 infection (temperature over 100 degrees, shortness of breath, cough, loss of taste/smell, generalized body aches, persistent headache, chills, sore throat, nausea/vomiting/diarrhea)  Your doctor is:       Dr. Jade Oscar, Ophthalmology: [phone]               Or dial [phone] and ask for the resident on call for:  Ophthalmology  For emergency care, call the:  [facility]:  [phone] (TTY for hearing impaired: [phone])

## 2023-05-16 NOTE — ANESTHESIA POSTPROCEDURE EVALUATION
Patient: Brianna Vallejo    Procedure: Procedure(s):  RIGHT EYE PHACOEMULSIFICATION, CATARACT, WITH INTRAOCULAR LENS IMPLANT       Anesthesia Type:  MAC    Note:  Disposition: Outpatient   Postop Pain Control: Uneventful            Sign Out: Well controlled pain   PONV: No   Neuro/Psych: Uneventful            Sign Out: Acceptable/Baseline neuro status   Airway/Respiratory: Uneventful            Sign Out: Acceptable/Baseline resp. status   CV/Hemodynamics: Uneventful            Sign Out: Acceptable CV status; No obvious hypovolemia; No obvious fluid overload   Other NRE: NONE   DID A NON-ROUTINE EVENT OCCUR? No           Last vitals:  Vitals Value Taken Time   /88 05/16/23 1337   Temp 36.7  C (98  F) 05/16/23 1337   Pulse 71 05/16/23 1337   Resp 17 05/16/23 1337   SpO2 99 % 05/16/23 1337       Electronically Signed By: Temi Fay MD  May 16, 2023  1:52 PM

## 2023-05-16 NOTE — OP NOTE
PREOPERATIVE DIAGNOSIS:   1. Combined form of age-reated cataract, Right eye   POSTOPERATIVE DIAGNOSIS:   1. Combined form of age-reated cataract, Right eye  PROCEDURES: Cataract extraction with intraocular lens implant Right eye.  SURGEON: Jade Oscar M.D.  ASSISTANT: none  CDE: 2.39  INDICATIONS: The patient Brianna Vallejo presented to the eye clinic with decreased vision secondary to cataract in the Right eye. The risks, benefits and alternatives to cataract extraction were discussed. The patient elected to proceed. All questions were answered to the patient's satisfaction.     DESCRIPTION OF PROCEDURE: Prior to the procedure, appropriate cardiac and respiratory monitors were applied to the patient.  In the pre-operative holding area, a drop of topical proparacaine 0.5% followed by three rounds of cyclopentolate 1%-tropicamide 1%-phenylephrine 2.5%,  by five minutes apart, were instilled into the procedure eye.  The patient was brought to the operating room where a surgical pause was carried out to identify with all members of the surgical team the correct surgical site.      With adequate anesthesia, the Right eye was prepped and draped in the usual sterile fashion for ophthalmic surgery. A lid speculum was placed, and the operating microscope was rotated into position. The surgeon was seated temporally. A paracentesis was created at the limbus with the surgeon's non dominant hand.  Through this limbal paracentesis, the anterior chamber was filled with preservative-free lidocaine, followed by preservative free epinephrine-BSS, followed by dispersive viscoelastic.  Next, the main wound was created via a clear corneal incision with the surgeon's dominant hand using a 2.6 mm keratome blade. A capsulorrhexis was initiated using a 25-gauge bent needle, Cystotome, and a continuous curvilinear capsulorhexis was completed in a circular fashion using the Utrata forceps. Following the capsulorhexis, some of  the OVD was egressed from the main wound, and then the lens nucleus was carefully hydrodissected using balanced salt solution on an AC cannula.  The lens nucleus was rotated and removed using phacoemulsification in a stop and chop technique.  Residual cortical material was removed using irrigation-aspiration.  The capsular bag was then filled with cohesive viscoelastic.  A +23.0 diopter [id number] lens was inserted into the capsular bag.  The lens power selected was reviewed using the intraocular lens power measurements that were obtained preoperatively to confirm that the correct lens was selected for the desired post-operative refractive state. After lens insertion, the residual viscoelastic was removed in its entirety with the I/A handpiece. The wounds were hydrated with balanced salt solution and found to be self-sealing. Preservative free intracameral moxifloxacin was administered. With a weck-aba spear the wounds were checked and no leaks were noted. The intraocular pressure was noted to be within the normal range to digital palpation.     The lid speculum was removed, one drop of timolol 0.5% was instilled in the operative eye. An eye shield were carefully placed over the operative eye. The patient tolerated the procedure well and without any complications.    PLAN: The patient will be discharged to home and will follow up for post operative visit as scheduled. Counseled return precautions  EBL:  None  COMPLICATIONS:  None  Implant Name Type Inv. Item Serial No.  Lot No. LRB No. Used Action   LENS [id number] [employer] UV ASPHERIC BICONVEX IOL - [id number] Lens/Eye Implant LENS [id number] [employer] UV ASPHERIC BICONVEX IOL [id number] [employer]  Right 1 Implanted

## 2023-05-16 NOTE — ANESTHESIA CARE TRANSFER NOTE
Patient: Brianna Vallejo    Procedure: Procedure(s):  RIGHT EYE PHACOEMULSIFICATION, CATARACT, WITH INTRAOCULAR LENS IMPLANT       Diagnosis: Combined form of age-related cataract, both eyes [H25.813]  Diagnosis Additional Information: No value filed.    Anesthesia Type:   MAC     Note:    Oropharynx: oropharynx clear of all foreign objects and spontaneously breathing  Level of Consciousness: awake  Oxygen Supplementation: room air    Independent Airway: airway patency satisfactory and stable  Dentition: dentition unchanged  Vital Signs Stable: post-procedure vital signs reviewed and stable  Report to RN Given: handoff report given  Patient transferred to: Phase II    Handoff Report: Identifed the Patient, Identified the Reponsible Provider, Reviewed the pertinent medical history, Discussed the surgical course, Reviewed Intra-OP anesthesia mangement and issues during anesthesia, Set expectations for post-procedure period and Allowed opportunity for questions and acknowledgement of understanding    vss. See post op vitals  Vitals:  Vitals Value Taken Time   BP     Temp     Pulse     Resp     SpO2         Electronically Signed By: THOMAS Thomson CRNA  May 16, 2023  1:39 PM

## 2023-05-16 NOTE — ANESTHESIA PREPROCEDURE EVALUATION
Anesthesia Pre-Procedure Evaluation    Patient: Brianna Vallejo   MRN: [id number] : 1950        Procedure : Procedure(s):  RIGHT EYE PHACOEMULSIFICATION, CATARACT, WITH INTRAOCULAR LENS IMPLANT          Past Medical History:   Diagnosis Date     Anxiety disorder      Chemical dependency (H)     in recovery     Genital herpes 1990s    ,      Gout      Insomnia      LBP (low back pain)      Nonsenile cataract      Osteopoikilosis      RBBB     neg cardiac workup      Ulcerative colitis 1967      Past Surgical History:   Procedure Laterality Date     CATARACT IOL, RT/LT Left      LARYNX SURGERY  2010     PHACOEMULSIFICATION CLEAR CORNEA WITH STANDARD INTRAOCULAR LENS IMPLANT Left 2023    Procedure: LEFT EYE PHACOEMULSIFICATION, CATARACT, WITH INTRAOCULAR LENS IMPLANT;  Surgeon: Jade Oscar MD;  [place] [facility] [place]      No Known Allergies   Social History     Tobacco Use     Smoking status: Former     Packs/day: 1.00     Years: 6.00     Pack years: 6.00     Types: Cigarettes     Quit date: 1974     Years since quittin.7     Smokeless tobacco: Never   Vaping Use     Vaping status: Not on file   Substance Use Topics     Alcohol use: No      Wt Readings from Last 1 Encounters:   23 54.4 kg (120 lb)              OUTSIDE LABS:  CBC:   Lab Results   Component Value Date    WBC 4.1 2019    WBC 4.7 2017    HGB 13.3 2019    HGB 12.8 2017    HCT 40.7 2019    HCT 38.9 2017     2019     2017     BMP:   Lab Results   Component Value Date     2023     2019    POTASSIUM 4.9 2023    POTASSIUM 3.7 2019    CHLORIDE 103 2023    CHLORIDE 108 2019    CO2 28 2023    CO2 28 2019    BUN 12.7 2023    BUN 14 2019    CR 0.73 2023    CR 0.73 2019    GLC 86 2023    GLC 88 2019     COAGS:   Lab Results   Component Value Date    PTT 27 2017     INR 0.98 03/02/2007     POC:   Lab Results   Component Value Date    BGM 89 09/08/2017     HEPATIC:   Lab Results   Component Value Date    ALBUMIN 4.1 03/02/2007    PROTTOTAL 7.0 03/02/2007    ALT 41 03/02/2007    AST 29 03/02/2007    ALKPHOS 90 03/02/2007    BILITOTAL 0.2 03/02/2007     OTHER:   Lab Results   Component Value Date    DARNELL 9.7 03/28/2023    TSH 2.53 03/28/2023    SED 9 09/13/2022       Anesthesia Plan    ASA Status:  3      Anesthesia Type: MAC.   Induction: Intravenous.   Maintenance: TIVA.        Consents    Anesthesia Plan(s) and associated risks, benefits, and realistic alternatives discussed. Questions answered and patient/representative(s) expressed understanding.    - Discussed:     - Discussed with:  Patient         Postoperative Care    Pain management: Multi-modal analgesia.   PONV prophylaxis: Ondansetron (or other 5HT-3), Background Propofol Infusion     Comments:                Temi Fay MD

## 2023-05-17 ENCOUNTER — OFFICE VISIT (OUTPATIENT)
Dept: OPHTHALMOLOGY | Facility: CLINIC | Age: 73
End: 2023-05-17
Attending: STUDENT IN AN ORGANIZED HEALTH CARE EDUCATION/TRAINING PROGRAM
Payer: COMMERCIAL

## 2023-05-17 DIAGNOSIS — Z98.890 POSTOPERATIVE EYE STATE: Primary | ICD-10-CM

## 2023-05-17 DIAGNOSIS — Z96.1 PSEUDOPHAKIA, BOTH EYES: ICD-10-CM

## 2023-05-17 PROCEDURE — 99024 POSTOP FOLLOW-UP VISIT: CPT | Performed by: STUDENT IN AN ORGANIZED HEALTH CARE EDUCATION/TRAINING PROGRAM

## 2023-05-17 PROCEDURE — G0463 HOSPITAL OUTPT CLINIC VISIT: HCPCS | Performed by: STUDENT IN AN ORGANIZED HEALTH CARE EDUCATION/TRAINING PROGRAM

## 2023-05-17 ASSESSMENT — TONOMETRY
OS_IOP_MMHG: 14
OD_IOP_MMHG: 15
IOP_METHOD: TONOPEN

## 2023-05-17 ASSESSMENT — SLIT LAMP EXAM - LIDS
COMMENTS: MGD
COMMENTS: MGD

## 2023-05-17 ASSESSMENT — VISUAL ACUITY
OD_SC+: -2
OS_SC: 20/40
OD_SC: 20/40
METHOD: SNELLEN - LINEAR

## 2023-05-17 ASSESSMENT — EXTERNAL EXAM - RIGHT EYE: OD_EXAM: NORMAL

## 2023-05-17 ASSESSMENT — EXTERNAL EXAM - LEFT EYE: OS_EXAM: NORMAL

## 2023-05-17 NOTE — NURSING NOTE
Chief Complaints and History of Present Illnesses   Patient presents with     Post Op (Ophthalmology) Both Eyes     Cataract extraction with IOL in the right eye on 05/16/23   Cataract extraction with IOL in the left eye on 05/02/2023        Chief Complaint(s) and History of Present Illness(es)     Post Op (Ophthalmology) Both Eyes            Laterality: both eyes    Associated symptoms: eye pain.  Negative for redness, tearing and photophobia    Treatments tried: eye drops    Pain scale: 0/10 (None currently)    Comments: Cataract extraction with IOL in the right eye on 05/16/23   Cataract extraction with IOL in the left eye on 05/02/2023             Comments    She tells me that she had pain last night in her right eye, but woke with the eye feeling comfortable. Her left eye continues to do well.  Her vision is good in the distance.    LANRE Bautista 11:24 AM  May 17, 2023

## 2023-05-17 NOTE — PROGRESS NOTES
HPI     Post Op (Ophthalmology) Both Eyes    In both eyes.  Associated symptoms include eye pain.  Negative for redness, tearing and photophobia.  Treatments tried include eye drops.  Pain was noted as 0/10 (None currently). Additional comments: Cataract extraction with IOL in the right eye on 05/16/23   Cataract extraction with IOL in the left eye on 05/02/2023              Comments    She tells me that she had pain last night in her right eye, but woke with the eye feeling comfortable. Her left eye continues to do well.  Her vision is good in the distance.    LANRE Bautista 11:24 AM  May 17, 2023               Last edited by Ro Rothman COT on 5/17/2023 11:24 AM.          Review of systems for the eyes was negative other than the pertinent positives/negatives listed in the HPI.    Ocular Meds: postop drops as instructed    Ocular Hx: refractive error OU; s/p CE/IOL left eye (5/2/23); s/p CE/IOL right eye (5/16/23)    FOHx: father - macular degeneration    PMHx: no Diabetes    SocHx: nonsmoker    Assessment & Plan      Brianna Vallejo is a 72 year old female with the following diagnoses:    1. Postoperative eye state    2. Pseudophakia, both eyes         Postoperative eye state  Pseudophakia right eye   - s/p CE/IOL right eye (5/16/23)  - Doing well, IOP wnl, happy with results, no signs of infection  - eye shield at bedtime x 1 week  - Start post-operative drops and taper according to instructions  -- vigamox QID right eye x 1 week  -- predforte 6 times a day OD until seen then will taper  -- ketorolac QID/TID/BID/daily/stop right eye; taper weekly  - Post-operative do's and don'ts reviewed, questions answered  - Counseled endophthalmitis/RD/return precautions    Pseudophakia left eye   - s/p CE/IOL left eye (5/2/23)  - Doing well, IOP wnl, happy with results, no signs of infection  - Start post-operative drops, taper according to instructions  -- discontinue vigamox  -- predforte/ketorolac BID/daily/stop  left eye; taper weekly  - Post-operative do's and don'ts reviewed, questions answered  - Counseled endophthalmitis/RD/return precautions    MGD/Dry Eyes OU  - PFATs QID and prn OU  - hold off on warm compresses BID OU  5-10 min each time until POM1 visit after cataract surgery    Refractive error  - refraction not dispensed, will refract after cataract surgery    Patient disposition:   Return for Follow Up for [place] post op cataract surgery visit, or sooner changes.      Attending Physician Attestation:  Complete documentation of historical and exam elements from today's encounter can be found in the full encounter summary report (not reduplicated in this progress note).  I personally obtained the chief complaint(s) and history of present illness.  I confirmed and edited as necessary the review of systems, past medical/surgical history, family history, social history, and examination findings as documented by others; and I examined the patient myself.  I personally reviewed the relevant tests, images, and reports as documented above.  I formulated and edited as necessary the assessment and plan and discussed the findings and management plan with the patient and family. . - Jade Oscar MD

## 2023-05-24 ENCOUNTER — OFFICE VISIT (OUTPATIENT)
Dept: OPHTHALMOLOGY | Facility: CLINIC | Age: 73
End: 2023-05-24
Attending: STUDENT IN AN ORGANIZED HEALTH CARE EDUCATION/TRAINING PROGRAM
Payer: COMMERCIAL

## 2023-05-24 DIAGNOSIS — Z96.1 PSEUDOPHAKIA, BOTH EYES: ICD-10-CM

## 2023-05-24 DIAGNOSIS — Z98.890 POSTOPERATIVE EYE STATE: Primary | ICD-10-CM

## 2023-05-24 PROCEDURE — 99024 POSTOP FOLLOW-UP VISIT: CPT | Performed by: STUDENT IN AN ORGANIZED HEALTH CARE EDUCATION/TRAINING PROGRAM

## 2023-05-24 PROCEDURE — G0463 HOSPITAL OUTPT CLINIC VISIT: HCPCS | Performed by: STUDENT IN AN ORGANIZED HEALTH CARE EDUCATION/TRAINING PROGRAM

## 2023-05-24 ASSESSMENT — SLIT LAMP EXAM - LIDS
COMMENTS: MGD
COMMENTS: MGD

## 2023-05-24 ASSESSMENT — VISUAL ACUITY
OD_SC: 20/40
OS_SC: 20/25
OD_SC+: +2
OD_PH_SC: 20/25
METHOD: SNELLEN - LINEAR
OS_SC+: -1

## 2023-05-24 ASSESSMENT — TONOMETRY
OS_IOP_MMHG: 14
OD_IOP_MMHG: 14
IOP_METHOD: TONOPEN

## 2023-05-24 ASSESSMENT — EXTERNAL EXAM - RIGHT EYE: OD_EXAM: NORMAL

## 2023-05-24 ASSESSMENT — EXTERNAL EXAM - LEFT EYE: OS_EXAM: NORMAL

## 2023-05-24 NOTE — NURSING NOTE
Chief Complaints and History of Present Illnesses   Patient presents with     Post Op (Ophthalmology) Both Eyes     Chief Complaint(s) and History of Present Illness(es)     Post Op (Ophthalmology) Both Eyes           Comments    POP CE/IOL both eyes.  No eye pain or eye changes.  The patient is using Ketorolac and Prednisolone daily in the left eye.  The patient is using Ofloxacin four times daily, Ketoralac four times and prednisolone six times in the right eye.  RODNEY Coates, COA 11:43 AM 05/24/2023

## 2023-05-24 NOTE — PROGRESS NOTES
HPI    POP CE/IOL both eyes.  No eye pain or eye changes.  The patient is using Ketorolac and Prednisolone daily in the left eye.  The patient is using Ofloxacin four times daily, Ketoralac four times and prednisolone six times in the right eye.  RODNEY Coates, RODNEY 11:43 AM 05/24/2023    Last edited by Heather Nice COA on 5/24/2023 11:45 AM.          Review of systems for the eyes was negative other than the pertinent positives/negatives listed in the HPI.    Ocular Meds: postop drops as instructed    Ocular Hx: refractive error OU; s/p CE/IOL left eye (5/2/23); s/p CE/IOL right eye (5/16/23)    FOHx: father - macular degeneration    PMHx: no diabetes    SocHx: nonsmoker    Assessment & Plan      Brianna Vallejo is a 72 year old female with the following diagnoses:    1. Postoperative eye state    2. Pseudophakia, both eyes       Postoperative eye state  Pseudophakia right eye   - s/p CE/IOL right eye (5/16/23)  - Doing well, IOP wnl, happy with results, no signs of infection  - discontinue eye shield  - post-operative drops and taper according to instructions  -- discontinue vigamox  -- predforte QID/TID/BID/daily/stop right eye; taper weekly  -- ketorolac TID/BID/daily/stop right eye; taper weekly  - Post-operative do's and don'ts reviewed, questions answered  - Counseled endophthalmitis/RD/return precautions    Pseudophakia left eye   - s/p CE/IOL left eye (5/2/23)  - Doing well, IOP wnl, happy with results, no signs of infection  - post-operative drops, taper according to instructions  -- predforte/ketorolac each daily to left eye x 1 week then stop  - Post-operative do's and don'ts reviewed, questions answered  - Counseled endophthalmitis/RD/return precautions    MGD/Dry Eyes OU  - PFATs QID and prn OU  - hold off on warm compresses BID OU  5-10 min each time until POM1 visit after cataract surgery    Refractive error  - refraction not dispensed, will refract after cataract surgery    Patient  disposition:   Return for Follow Up VTD OU MRx for post op cataract surgery visit, or sooner changes.      Attending Physician Attestation:  Complete documentation of historical and exam elements from today's encounter can be found in the full encounter summary report (not reduplicated in this progress note).  I personally obtained the chief complaint(s) and history of present illness.  I confirmed and edited as necessary the review of systems, past medical/surgical history, family history, social history, and examination findings as documented by others; and I examined the patient myself.  I personally reviewed the relevant tests, images, and reports as documented above.  I formulated and edited as necessary the assessment and plan and discussed the findings and management plan with the patient and family. . - Jade Oscar MD

## 2023-06-14 ENCOUNTER — OFFICE VISIT (OUTPATIENT)
Dept: OPHTHALMOLOGY | Facility: CLINIC | Age: 73
End: 2023-06-14
Attending: STUDENT IN AN ORGANIZED HEALTH CARE EDUCATION/TRAINING PROGRAM
Payer: COMMERCIAL

## 2023-06-14 DIAGNOSIS — Z96.1 PSEUDOPHAKIA, BOTH EYES: ICD-10-CM

## 2023-06-14 DIAGNOSIS — H52.203 MYOPIA OF BOTH EYES WITH ASTIGMATISM AND PRESBYOPIA: ICD-10-CM

## 2023-06-14 DIAGNOSIS — H04.123 DRY EYES, BILATERAL: ICD-10-CM

## 2023-06-14 DIAGNOSIS — Z98.890 POSTOPERATIVE EYE STATE: Primary | ICD-10-CM

## 2023-06-14 DIAGNOSIS — H52.4 MYOPIA OF BOTH EYES WITH ASTIGMATISM AND PRESBYOPIA: ICD-10-CM

## 2023-06-14 DIAGNOSIS — H52.13 MYOPIA OF BOTH EYES WITH ASTIGMATISM AND PRESBYOPIA: ICD-10-CM

## 2023-06-14 PROCEDURE — 99024 POSTOP FOLLOW-UP VISIT: CPT | Performed by: STUDENT IN AN ORGANIZED HEALTH CARE EDUCATION/TRAINING PROGRAM

## 2023-06-14 PROCEDURE — G0463 HOSPITAL OUTPT CLINIC VISIT: HCPCS | Performed by: STUDENT IN AN ORGANIZED HEALTH CARE EDUCATION/TRAINING PROGRAM

## 2023-06-14 PROCEDURE — 92015 DETERMINE REFRACTIVE STATE: CPT

## 2023-06-14 ASSESSMENT — REFRACTION_MANIFEST
OD_CYLINDER: +0.50
OD_SPHERE: -0.50
OD_AXIS: 040
OS_SPHERE: -0.75
OS_CYLINDER: +0.75
OS_AXIS: 120
OS_ADD: +2.50
OD_ADD: +2.50

## 2023-06-14 ASSESSMENT — TONOMETRY
OS_IOP_MMHG: 15
OD_IOP_MMHG: 17
IOP_METHOD: TONOPEN

## 2023-06-14 ASSESSMENT — EXTERNAL EXAM - RIGHT EYE: OD_EXAM: NORMAL

## 2023-06-14 ASSESSMENT — CUP TO DISC RATIO
OS_RATIO: 0.3
OD_RATIO: 0.3

## 2023-06-14 ASSESSMENT — VISUAL ACUITY
OD_SC+: -1
OS_SC: 20/25
OD_SC: 20/25
METHOD: SNELLEN - LINEAR
OS_SC+: -1

## 2023-06-14 ASSESSMENT — SLIT LAMP EXAM - LIDS
COMMENTS: MGD
COMMENTS: MGD

## 2023-06-14 ASSESSMENT — EXTERNAL EXAM - LEFT EYE: OS_EXAM: NORMAL

## 2023-06-14 NOTE — NURSING NOTE
Chief Complaints and History of Present Illnesses   Patient presents with     Post Op (Ophthalmology) Both Eyes     Chief Complaint(s) and History of Present Illness(es)     Post Op (Ophthalmology) Both Eyes           Comments    POP BE CE/IOL Right eye 05/16/2023 and Left eye on 05/02/2023.  The patient notes she is doing well.  She has no eye pain.  She is anxious to get eyeglasses.  Heather Nice, COA, COA 10:49 AM 06/14/2023

## 2023-06-14 NOTE — PROGRESS NOTES
HPI    POP BE CE/IOL Right eye 05/16/2023 and Left eye on 05/02/2023.  The patient notes she is doing well.  She has no eye pain.  She is anxious to get eyeglasses.  RODNEY Coates, COA 10:49 AM 06/14/2023    Last edited by Heather Nice COA on 6/14/2023 10:49 AM.          Review of systems for the eyes was negative other than the pertinent positives/negatives listed in the HPI.    Ocular Meds: postop drops as instructed    Ocular Hx: refractive error OU; s/p CE/IOL left eye (5/2/23); s/p CE/IOL right eye (5/16/23)    FOHx: father - macular degeneration    PMHx: no diabetes    SocHx: nonsmoker    Assessment & Plan      Brianna Vallejo is a 73 year old female with the following diagnoses:    Postoperative eye state  Pseudophakia both eyes   - s/p CE/IOL right eye (5/16/23)  - s/p CE/IOL left eye (5/2/23)  - Doing well, IOP wnl, happy with results, no signs of infection  - off all drops  - Counseled endophthalmitis/RD/return precautions    MGD/Dry Eyes OU  - PFATs QID and prn OU  - warm compresses BID OU x 5-10 min each time    Myopia of both eyes with astigmatism and presbyopia  - refraction reviewed and dispensed today    Counseled return/RD precautions    Patient disposition:   Return in about 1 year (around 6/14/2024) for Annual Visit, or sooner changes.      Attending Physician Attestation:  Complete documentation of historical and exam elements from today's encounter can be found in the full encounter summary report (not reduplicated in this progress note).  I personally obtained the chief complaint(s) and history of present illness.  I confirmed and edited as necessary the review of systems, past medical/surgical history, family history, social history, and examination findings as documented by others; and I examined the patient myself.  I personally reviewed the relevant tests, images, and reports as documented above.  I formulated and edited as necessary the assessment and plan and discussed  the findings and management plan with the patient and family. . - Jade Oscar MD

## 2024-01-06 ENCOUNTER — HEALTH MAINTENANCE LETTER (OUTPATIENT)
Age: 74
End: 2024-01-06

## 2024-02-28 ENCOUNTER — LAB REQUISITION (OUTPATIENT)
Dept: LAB | Facility: CLINIC | Age: 74
End: 2024-02-28

## 2024-02-28 DIAGNOSIS — R10.11 RIGHT UPPER QUADRANT PAIN: ICD-10-CM

## 2024-02-28 PROCEDURE — 83690 ASSAY OF LIPASE: CPT | Performed by: STUDENT IN AN ORGANIZED HEALTH CARE EDUCATION/TRAINING PROGRAM

## 2024-02-28 PROCEDURE — 82374 ASSAY BLOOD CARBON DIOXIDE: CPT | Performed by: STUDENT IN AN ORGANIZED HEALTH CARE EDUCATION/TRAINING PROGRAM

## 2024-02-29 LAB
ALBUMIN SERPL BCG-MCNC: 4.4 G/DL (ref 3.5–5.2)
ALP SERPL-CCNC: 68 U/L (ref 40–150)
ALT SERPL W P-5'-P-CCNC: 12 U/L (ref 0–50)
ANION GAP SERPL CALCULATED.3IONS-SCNC: 9 MMOL/L (ref 7–15)
AST SERPL W P-5'-P-CCNC: 22 U/L (ref 0–45)
BILIRUB SERPL-MCNC: 0.4 MG/DL
BUN SERPL-MCNC: 14.9 MG/DL (ref 8–23)
CALCIUM SERPL-MCNC: 9.5 MG/DL (ref 8.8–10.2)
CHLORIDE SERPL-SCNC: 104 MMOL/L (ref 98–107)
CREAT SERPL-MCNC: 0.69 MG/DL (ref 0.51–0.95)
DEPRECATED HCO3 PLAS-SCNC: 25 MMOL/L (ref 22–29)
EGFRCR SERPLBLD CKD-EPI 2021: >90 ML/MIN/1.73M2
GLUCOSE SERPL-MCNC: 90 MG/DL (ref 70–99)
LIPASE SERPL-CCNC: 23 U/L (ref 13–60)
POTASSIUM SERPL-SCNC: 5.5 MMOL/L (ref 3.4–5.3)
PROT SERPL-MCNC: 6.9 G/DL (ref 6.4–8.3)
SODIUM SERPL-SCNC: 138 MMOL/L (ref 135–145)

## 2024-05-06 ENCOUNTER — TELEPHONE (OUTPATIENT)
Dept: OPHTHALMOLOGY | Facility: CLINIC | Age: 74
End: 2024-05-06
Payer: COMMERCIAL

## 2024-06-04 ENCOUNTER — LAB REQUISITION (OUTPATIENT)
Dept: LAB | Facility: CLINIC | Age: 74
End: 2024-06-04

## 2024-06-04 DIAGNOSIS — I49.9 CARDIAC ARRHYTHMIA, UNSPECIFIED: ICD-10-CM

## 2024-06-04 PROCEDURE — 82607 VITAMIN B-12: CPT | Performed by: FAMILY MEDICINE

## 2024-06-04 PROCEDURE — 80048 BASIC METABOLIC PNL TOTAL CA: CPT | Performed by: FAMILY MEDICINE

## 2024-06-04 PROCEDURE — 84443 ASSAY THYROID STIM HORMONE: CPT | Performed by: FAMILY MEDICINE

## 2024-06-05 LAB
ANION GAP SERPL CALCULATED.3IONS-SCNC: 9 MMOL/L (ref 7–15)
BUN SERPL-MCNC: 18.9 MG/DL (ref 8–23)
CALCIUM SERPL-MCNC: 9.5 MG/DL (ref 8.8–10.2)
CHLORIDE SERPL-SCNC: 107 MMOL/L (ref 98–107)
CREAT SERPL-MCNC: 0.7 MG/DL (ref 0.51–0.95)
DEPRECATED HCO3 PLAS-SCNC: 26 MMOL/L (ref 22–29)
EGFRCR SERPLBLD CKD-EPI 2021: 90 ML/MIN/1.73M2
GLUCOSE SERPL-MCNC: 92 MG/DL (ref 70–99)
POTASSIUM SERPL-SCNC: 5.7 MMOL/L (ref 3.4–5.3)
SODIUM SERPL-SCNC: 142 MMOL/L (ref 135–145)
TSH SERPL DL<=0.005 MIU/L-ACNC: 1.93 UIU/ML (ref 0.3–4.2)
VIT B12 SERPL-MCNC: 508 PG/ML (ref 232–1245)

## 2024-06-14 ENCOUNTER — OFFICE VISIT (OUTPATIENT)
Dept: OPHTHALMOLOGY | Facility: CLINIC | Age: 74
End: 2024-06-14
Attending: STUDENT IN AN ORGANIZED HEALTH CARE EDUCATION/TRAINING PROGRAM
Payer: COMMERCIAL

## 2024-06-14 DIAGNOSIS — H52.203 MYOPIA OF BOTH EYES WITH ASTIGMATISM AND PRESBYOPIA: ICD-10-CM

## 2024-06-14 DIAGNOSIS — H04.123 DRY EYES, BILATERAL: ICD-10-CM

## 2024-06-14 DIAGNOSIS — H52.4 MYOPIA OF BOTH EYES WITH ASTIGMATISM AND PRESBYOPIA: ICD-10-CM

## 2024-06-14 DIAGNOSIS — Z96.1 PSEUDOPHAKIA, BOTH EYES: Primary | ICD-10-CM

## 2024-06-14 DIAGNOSIS — H52.13 MYOPIA OF BOTH EYES WITH ASTIGMATISM AND PRESBYOPIA: ICD-10-CM

## 2024-06-14 PROCEDURE — G0463 HOSPITAL OUTPT CLINIC VISIT: HCPCS | Performed by: STUDENT IN AN ORGANIZED HEALTH CARE EDUCATION/TRAINING PROGRAM

## 2024-06-14 PROCEDURE — 92014 COMPRE OPH EXAM EST PT 1/>: CPT | Performed by: STUDENT IN AN ORGANIZED HEALTH CARE EDUCATION/TRAINING PROGRAM

## 2024-06-14 RX ORDER — CARBOXYMETHYLCELLULOSE SODIUM 5 MG/ML
1 SOLUTION/ DROPS OPHTHALMIC 3 TIMES DAILY PRN
COMMUNITY

## 2024-06-14 RX ORDER — MULTIVITAMIN
1 TABLET ORAL DAILY
COMMUNITY

## 2024-06-14 ASSESSMENT — CONF VISUAL FIELD
OS_NORMAL: 1
OD_INFERIOR_TEMPORAL_RESTRICTION: 0
METHOD: COUNTING FINGERS
OD_SUPERIOR_TEMPORAL_RESTRICTION: 0
OS_SUPERIOR_NASAL_RESTRICTION: 0
OD_INFERIOR_NASAL_RESTRICTION: 0
OS_INFERIOR_NASAL_RESTRICTION: 0
OS_INFERIOR_TEMPORAL_RESTRICTION: 0
OD_SUPERIOR_NASAL_RESTRICTION: 0
OS_SUPERIOR_TEMPORAL_RESTRICTION: 0
OD_NORMAL: 1

## 2024-06-14 ASSESSMENT — REFRACTION_WEARINGRX
OS_SPHERE: -0.75
OD_ADD: +2.50
OD_SPHERE: -0.50
OD_CYLINDER: +0.50
OS_ADD: +2.50
OD_AXIS: 040
OS_AXIS: 120
OS_CYLINDER: +0.75
SPECS_TYPE: PAL

## 2024-06-14 ASSESSMENT — VISUAL ACUITY
OD_CC+: -1
OD_CC: J1+
METHOD: SNELLEN - LINEAR
OS_CC: 20/20
OS_CC: J1+
OD_CC: 20/20

## 2024-06-14 ASSESSMENT — EXTERNAL EXAM - RIGHT EYE: OD_EXAM: NORMAL

## 2024-06-14 ASSESSMENT — TONOMETRY
OD_IOP_MMHG: 14
IOP_METHOD: TONOPEN
OS_IOP_MMHG: 17

## 2024-06-14 ASSESSMENT — EXTERNAL EXAM - LEFT EYE: OS_EXAM: NORMAL

## 2024-06-14 ASSESSMENT — SLIT LAMP EXAM - LIDS
COMMENTS: MGD
COMMENTS: MGD

## 2024-06-14 ASSESSMENT — CUP TO DISC RATIO
OD_RATIO: 0.3
OS_RATIO: 0.3

## 2024-06-14 NOTE — NURSING NOTE
Chief Complaints and History of Present Illnesses   Patient presents with    Pseudophakia Follow Up     Chief Complaint(s) and History of Present Illness(es)       Pseudophakia Follow Up              Laterality: both eyes    Associated symptoms: Negative for blurred vision    Pain scale: 0/10              Comments    Brianna is here for her annual follow up of pseudophakia both eyes. (Left eye, 5-2-23;Right eye , 5-16-23). Today she states vision is good, and feels glasses are working for her.    Scott Smith COT 8:47 AM June 14, 2024

## 2024-06-14 NOTE — PROGRESS NOTES
HPI       Pseudophakia Follow Up    In both eyes.  Associated symptoms include Negative for blurred vision.  Pain was noted as 0/10.             Comments    Brianna is here for her annual follow up of pseudophakia both eyes. (Left eye, 5-2-23;Right eye , 5-16-23). Today she states vision is good, and feels glasses are working for her.    Scott Smith COT 8:47 AM June 14, 2024             Last edited by Scott Smith on 6/14/2024  8:47 AM.          Review of systems for the eyes was negative other than the pertinent positives/negatives listed in the HPI.    Ocular Meds: none    Ocular Hx: refractive error OU; s/p CE/IOL left eye (5/2/23); s/p CE/IOL right eye (5/16/23); CR scar OD    FOHx: father - macular degeneration    PMHx: no diabetes    SocHx: nonsmoker    Assessment & Plan      Brianna Vallejo is a 74 year old female with the following diagnoses:    Pseudophakia both eyes   - s/p CE/IOL right eye (5/16/23)  - s/p CE/IOL left eye (5/2/23)  - Doing well, mild PCO OD off axis  - observe    MGD of upper and lower lids of both eyes  Dry Eyes OU  - ATs prn OU  - warm compresses BID OU x 5-10 min each time    CR scar OD  - previously noted; focal CR atrophic spot with surrounding pigment, previously noted; no other holes, tears, or detachment  - no new flashes, floaters, or curtain down visual field  - given chronicity and asymptomatic, will observe  - RD precautions    Myopia of both eyes with astigmatism and presbyopia  - happy with current MRx    Counseled return/RD precautions    Patient disposition:   Return in about 1 year (around 6/14/2025) for Annual Visit, or sooner changes.      Attending Physician Attestation:  Complete documentation of historical and exam elements from today's encounter can be found in the full encounter summary report (not reduplicated in this progress note).  I personally obtained the chief complaint(s) and history of present illness.  I confirmed and edited as necessary the review of  systems, past medical/surgical history, family history, social history, and examination findings as documented by others; and I examined the patient myself.  I personally reviewed the relevant tests, images, and reports as documented above.  I formulated and edited as necessary the assessment and plan and discussed the findings and management plan with the patient and family. . - Jade Oscar MD

## 2024-07-05 ENCOUNTER — LAB REQUISITION (OUTPATIENT)
Dept: LAB | Facility: CLINIC | Age: 74
End: 2024-07-05

## 2024-07-05 DIAGNOSIS — E87.5 HYPERKALEMIA: ICD-10-CM

## 2024-07-05 LAB — POTASSIUM SERPL-SCNC: 5.5 MMOL/L (ref 3.4–5.3)

## 2024-07-05 PROCEDURE — 84132 ASSAY OF SERUM POTASSIUM: CPT | Performed by: FAMILY MEDICINE

## 2024-07-08 ENCOUNTER — TRANSCRIBE ORDERS (OUTPATIENT)
Dept: OTHER | Age: 74
End: 2024-07-08

## 2024-07-08 DIAGNOSIS — M54.2 NECK PAIN: Primary | ICD-10-CM

## 2024-07-24 ENCOUNTER — THERAPY VISIT (OUTPATIENT)
Dept: PHYSICAL THERAPY | Facility: CLINIC | Age: 74
End: 2024-07-24
Attending: FAMILY MEDICINE
Payer: COMMERCIAL

## 2024-07-24 DIAGNOSIS — M25.511 CHRONIC RIGHT SHOULDER PAIN: ICD-10-CM

## 2024-07-24 DIAGNOSIS — G89.29 CHRONIC RIGHT SHOULDER PAIN: ICD-10-CM

## 2024-07-24 DIAGNOSIS — M54.2 CERVICAL PAIN: Primary | ICD-10-CM

## 2024-07-24 PROCEDURE — 97110 THERAPEUTIC EXERCISES: CPT | Mod: GP | Performed by: PHYSICAL THERAPIST

## 2024-07-24 PROCEDURE — 97112 NEUROMUSCULAR REEDUCATION: CPT | Mod: GP | Performed by: PHYSICAL THERAPIST

## 2024-07-24 PROCEDURE — 97161 PT EVAL LOW COMPLEX 20 MIN: CPT | Mod: GP | Performed by: PHYSICAL THERAPIST

## 2024-07-24 NOTE — PROGRESS NOTES
PHYSICAL THERAPY EVALUATION  Type of Visit: Evaluation              Subjective       Presenting condition or subjective complaint: Shoulder and neck pain. Pt reports a previous history of PT in 2023 for bilateral shoulders and has been completing HEP independently which has improved slightly, but still has bilateral shoulder pain R>L and now having more cervical pain lately (R>L).   Date of onset: 01/01/24    Relevant medical history:     Dates & types of surgery:      Prior diagnostic imaging/testing results:       Prior therapy history for the same diagnosis, illness or injury: Yes PT fall of 2023    Prior Level of Function  Transfers:   Ambulation:   ADL:   IADL:     Living Environment  Social support: With a significant other or spouse   Type of home: House   Stairs to enter the home: Yes 4 Is there a railing: Yes     Ramp: No   Stairs inside the home: Yes 30 Is there a railing: Yes     Help at home: None  Equipment owned:       Employment: No    Hobbies/Interests:      Patient goals for therapy:      Pain assessment: See objective evaluation for additional pain details     Objective   SHOULDER EVALUATION  PAIN: Pain Level at Rest: 3/10  Pain Level with Use: 8/10  Pain Location: R>L lower cervical pain; B lateral shoulder and upper arm  Pain Quality: Aching  Pain Frequency: constant  Pain is Worst: neck stiffness in the am  Pain is Exacerbated By: sleeping at night, reaching behind back, pickleball, swing dancing  Pain is Relieved By: NSAIDs  Pain Progression: Worsened  INTEGUMENTARY (edema, incisions):   POSTURE: Sitting Posture: Rounded shoulders, Forward head  GAIT:   Weightbearing Status:   Assistive Device(s):   Gait Deviations:   BALANCE/PROPRIOCEPTION:   WEIGHTBEARING ALIGNMENT:   ROM:   (Degrees) Left AROM Left PROM Right AROM  Right PROM   Shoulder Flexion WFL  WFL    Shoulder Extension       Shoulder Abduction WFL  WFL, pain + at 90     Shoulder Adduction       Shoulder Internal Rotation Thumb to T12   Thumb to L5, pain ++    Shoulder External Rotation       Shoulder Horizontal Abduction       Shoulder Horizontal Adduction       Shoulder Flexion ER       Shoulder Flexion IR       Elbow Extension       Elbow Flexion       Pain:   End feel:     STRENGTH:   Pain: - none + mild ++ moderate +++ severe  Strength Scale: 0-5/5 Left Right   Shoulder Flexion 5 5, + (mild)   Shoulder Extension     Shoulder Abduction 5 5-, + (mild)   Shoulder Adduction     Shoulder Internal Rotation 5 5   Shoulder External Rotation 5- 5-   Shoulder Horizontal Abduction     Shoulder Horizontal Adduction     Elbow Flexion     Elbow Extension     Mid Trap     Lower Trap     Rhomboid     Serratus Anterior       FLEXIBILITY:   SPECIAL TESTS:    Left Right   Impingement     Neer's     Hawkin's-Julian  Positive   Coracoid Impingement     Internal impingement     Labral     Anterior Slide     Tama's     Crank     Instability     Apprehension (anterior)     Relocation (anterior)     Anterior Load & Shift     Posterior Load & Shift     Posterior instability (with 90 degrees flex)     Multi-Directional Instability      Sulcus     Biceps      Speed's     Rotator Cuff Tear     Drop Arm     Belly Press     Lift off        PALPATION:   JOINT MOBILITY:   CERVICAL SCREEN:   (Degrees) Left AROM Right AROM   Cervical Flexion Min loss, pain +   Cervical Extension Min loss, pain +   Side bend Mod loss Min loss   Rotation     Thoracic Flexion    Thoracic Extension    Thoracic Rotation     Thoracic Outlet Screen (Laci, Adson)        Left Right   Alar Ligament    Cervical Flexion-Rotation     Cervical Rot/Lateral Flex     Compression     Distraction     Spurling s     Thoracic Outlet Screen (Laci, Adson)     Transverse Ligament     Vertebral Artery         Assessment & Plan   CLINICAL IMPRESSIONS  Medical Diagnosis: Cervical and R shoulder pain    Treatment Diagnosis: Cervical and R shoulder pain   Impression/Assessment: Patient is a 74 year old female with  Cervical and R shoulder complaints.  The following significant findings have been identified: Pain, Decreased ROM/flexibility, Decreased joint mobility, and Decreased strength. These impairments interfere with their ability to perform self care tasks, work tasks, recreational activities, and household chores as compared to previous level of function.       Clinical Decision Making (Complexity):  Clinical Presentation: Stable/Uncomplicated  Clinical Presentation Rationale: based on medical and personal factors listed in PT evaluation  Clinical Decision Making (Complexity): Low complexity    PLAN OF CARE  Treatment Interventions:  Interventions: Manual Therapy, Neuromuscular Re-education, Therapeutic Activity, Therapeutic Exercise    Long Term Goals     PT Goal 1  Goal Identifier: Lifting  Goal Description: Pt will be able to lift her R arm out to the side and overhead w/ 3 lb wt w/o increased pain  Rationale: to maximize safety and independence with performance of ADLs and functional tasks  Goal Progress: pain 5/10  Target Date: 10/16/24      Frequency of Treatment: 1x week  Duration of Treatment: 4 weeks then 2 x month for 8 weeks    Recommended Referrals to Other Professionals:   Education Assessment:   Learner/Method: Patient;No Barriers to Learning    Risks and benefits of evaluation/treatment have been explained.   Patient/Family/caregiver agrees with Plan of Care.     Evaluation Time:     PT Eval, Low Complexity Minutes (79548): 20       Signing Clinician: Kevin Jones, PT        [facility]                                                                                   OUTPATIENT PHYSICAL THERAPY      PLAN OF TREATMENT FOR OUTPATIENT REHABILITATION   Patient's Last Name, First Name, Brianna Dick YOB: 1950   Provider's Name   [facility]   Medical Record No.  [id number]     Onset Date: 01/01/24  Start of Care Date:  07/24/24     Medical Diagnosis:  Cervical and R shoulder pain      PT Treatment Diagnosis:  Cervical and R shoulder pain Plan of Treatment  Frequency/Duration: 1x week/ 4 weeks then 2 x month for 8 weeks    Certification date from 07/24/24 to 10/16/24         See note for plan of treatment details and functional goals     Kevin Jones, PT                         I CERTIFY THE NEED FOR THESE SERVICES FURNISHED UNDER        THIS PLAN OF TREATMENT AND WHILE UNDER MY CARE .             Physician Signature               Date    X_____________________________________________________                  Referring Provider:  Marybeth Ward    Initial Assessment  See Epic Evaluation- Start of Care Date: 07/24/24

## 2024-07-25 PROBLEM — M54.2 CERVICAL PAIN: Status: ACTIVE | Noted: 2024-07-25

## 2024-07-25 PROBLEM — M25.511 CHRONIC RIGHT SHOULDER PAIN: Status: ACTIVE | Noted: 2024-07-25

## 2024-07-25 PROBLEM — G89.29 CHRONIC RIGHT SHOULDER PAIN: Status: ACTIVE | Noted: 2024-07-25

## 2024-07-31 ENCOUNTER — VIRTUAL VISIT (OUTPATIENT)
Dept: PHYSICAL THERAPY | Facility: CLINIC | Age: 74
End: 2024-07-31
Payer: COMMERCIAL

## 2024-07-31 DIAGNOSIS — G89.29 CHRONIC RIGHT SHOULDER PAIN: ICD-10-CM

## 2024-07-31 DIAGNOSIS — M54.2 CERVICAL PAIN: Primary | ICD-10-CM

## 2024-07-31 DIAGNOSIS — M25.511 CHRONIC RIGHT SHOULDER PAIN: ICD-10-CM

## 2024-07-31 PROCEDURE — 97110 THERAPEUTIC EXERCISES: CPT | Mod: GP | Performed by: PHYSICAL THERAPIST

## 2024-07-31 PROCEDURE — 97112 NEUROMUSCULAR REEDUCATION: CPT | Mod: GP | Performed by: PHYSICAL THERAPIST

## 2024-08-07 ENCOUNTER — THERAPY VISIT (OUTPATIENT)
Dept: PHYSICAL THERAPY | Facility: CLINIC | Age: 74
End: 2024-08-07
Attending: FAMILY MEDICINE
Payer: COMMERCIAL

## 2024-08-07 DIAGNOSIS — M54.2 CERVICAL PAIN: Primary | ICD-10-CM

## 2024-08-07 DIAGNOSIS — M25.511 CHRONIC RIGHT SHOULDER PAIN: ICD-10-CM

## 2024-08-07 DIAGNOSIS — G89.29 CHRONIC RIGHT SHOULDER PAIN: ICD-10-CM

## 2024-08-07 PROCEDURE — 97110 THERAPEUTIC EXERCISES: CPT | Mod: GP | Performed by: PHYSICAL THERAPIST

## 2024-08-07 PROCEDURE — 97140 MANUAL THERAPY 1/> REGIONS: CPT | Mod: GP | Performed by: PHYSICAL THERAPIST

## 2024-08-21 ENCOUNTER — THERAPY VISIT (OUTPATIENT)
Dept: PHYSICAL THERAPY | Facility: CLINIC | Age: 74
End: 2024-08-21
Payer: COMMERCIAL

## 2024-08-21 DIAGNOSIS — G89.29 CHRONIC RIGHT SHOULDER PAIN: ICD-10-CM

## 2024-08-21 DIAGNOSIS — M54.2 CERVICAL PAIN: Primary | ICD-10-CM

## 2024-08-21 DIAGNOSIS — M25.511 CHRONIC RIGHT SHOULDER PAIN: ICD-10-CM

## 2024-08-21 PROCEDURE — 97140 MANUAL THERAPY 1/> REGIONS: CPT | Mod: GP | Performed by: PHYSICAL THERAPIST

## 2024-09-04 ENCOUNTER — THERAPY VISIT (OUTPATIENT)
Dept: PHYSICAL THERAPY | Facility: CLINIC | Age: 74
End: 2024-09-04
Payer: COMMERCIAL

## 2024-09-04 DIAGNOSIS — M54.2 CERVICAL PAIN: Primary | ICD-10-CM

## 2024-09-04 DIAGNOSIS — G89.29 CHRONIC RIGHT SHOULDER PAIN: ICD-10-CM

## 2024-09-04 DIAGNOSIS — M25.511 CHRONIC RIGHT SHOULDER PAIN: ICD-10-CM

## 2024-09-04 PROCEDURE — 97140 MANUAL THERAPY 1/> REGIONS: CPT | Mod: GP | Performed by: PHYSICAL THERAPIST

## 2024-09-04 PROCEDURE — 97112 NEUROMUSCULAR REEDUCATION: CPT | Mod: GP | Performed by: PHYSICAL THERAPIST

## 2024-09-04 NOTE — PROGRESS NOTES
Pt has attended 5 PT sessions addressing her chronic R cervical/shoulder pain/stiffness. Pt has demonstrated good technique for HEP and is appropriate for continued independent management     DISCHARGE  Reason for Discharge: No further expectation of progress. Independent in HEP    Discharge Plan: Patient to continue home program.    Referring Provider:  Marybeth Ward

## 2024-11-26 ENCOUNTER — THERAPY VISIT (OUTPATIENT)
Dept: PHYSICAL THERAPY | Facility: CLINIC | Age: 74
End: 2024-11-26
Payer: COMMERCIAL

## 2024-11-26 DIAGNOSIS — M25.561 ACUTE PAIN OF RIGHT KNEE: Primary | ICD-10-CM

## 2024-11-26 PROCEDURE — 97161 PT EVAL LOW COMPLEX 20 MIN: CPT | Mod: GP | Performed by: PHYSICAL THERAPIST

## 2024-11-26 PROCEDURE — 97110 THERAPEUTIC EXERCISES: CPT | Mod: GP | Performed by: PHYSICAL THERAPIST

## 2024-11-26 ASSESSMENT — ACTIVITIES OF DAILY LIVING (ADL)
GIVING WAY, BUCKLING OR SHIFTING OF KNEE: I DO NOT HAVE THE SYMPTOM
HOW_WOULD_YOU_RATE_THE_CURRENT_FUNCTION_OF_YOUR_KNEE_DURING_YOUR_USUAL_DAILY_ACTIVITIES_ON_A_SCALE_FROM_0_TO_100_WITH_100_BEING_YOUR_LEVEL_OF_KNEE_FUNCTION_PRIOR_TO_YOUR_INJURY_AND_0_BEING_THE_INABILITY_TO_PERFORM_ANY_OF_YOUR_USUAL_DAILY_ACTIVITIES?: 25
LIMPING: THE SYMPTOM AFFECTS MY ACTIVITY MODERATELY
AS_A_RESULT_OF_YOUR_KNEE_INJURY,_HOW_WOULD_YOU_RATE_YOUR_CURRENT_LEVEL_OF_DAILY_ACTIVITY?: ABNORMAL
STIFFNESS: THE SYMPTOM AFFECTS MY ACTIVITY SLIGHTLY
SQUAT: ACTIVITY IS MINIMALLY DIFFICULT
SQUAT: ACTIVITY IS MINIMALLY DIFFICULT
SIT WITH YOUR KNEE BENT: ACTIVITY IS NOT DIFFICULT
RISE FROM A CHAIR: ACTIVITY IS NOT DIFFICULT
KNEEL ON THE FRONT OF YOUR KNEE: ACTIVITY IS MINIMALLY DIFFICULT
WEAKNESS: THE SYMPTOM AFFECTS MY ACTIVITY SLIGHTLY
SWELLING: I DO NOT HAVE THE SYMPTOM
HOW_WOULD_YOU_RATE_THE_CURRENT_FUNCTION_OF_YOUR_KNEE_DURING_YOUR_USUAL_DAILY_ACTIVITIES_ON_A_SCALE_FROM_0_TO_100_WITH_100_BEING_YOUR_LEVEL_OF_KNEE_FUNCTION_PRIOR_TO_YOUR_INJURY_AND_0_BEING_THE_INABILITY_TO_PERFORM_ANY_OF_YOUR_USUAL_DAILY_ACTIVITIES?: 25
STAND: ACTIVITY IS NOT DIFFICULT
RAW_SCORE: 52
STIFFNESS: THE SYMPTOM AFFECTS MY ACTIVITY SLIGHTLY
GIVING WAY, BUCKLING OR SHIFTING OF KNEE: I DO NOT HAVE THE SYMPTOM
KNEE_ACTIVITY_OF_DAILY_LIVING_SCORE: 74.29
SWELLING: I DO NOT HAVE THE SYMPTOM
HOW_WOULD_YOU_RATE_THE_OVERALL_FUNCTION_OF_YOUR_KNEE_DURING_YOUR_USUAL_DAILY_ACTIVITIES?: NEARLY NORMAL
KNEE_ACTIVITY_OF_DAILY_LIVING_SUM: 52
PLEASE_INDICATE_YOR_PRIMARY_REASON_FOR_REFERRAL_TO_THERAPY:: KNEE
STAND: ACTIVITY IS NOT DIFFICULT
KNEEL ON THE FRONT OF YOUR KNEE: ACTIVITY IS MINIMALLY DIFFICULT
WALK: ACTIVITY IS SOMEWHAT DIFFICULT
GO UP STAIRS: ACTIVITY IS SOMEWHAT DIFFICULT
PAIN: THE SYMPTOM AFFECTS MY ACTIVITY MODERATELY
GO UP STAIRS: ACTIVITY IS SOMEWHAT DIFFICULT
WALK: ACTIVITY IS SOMEWHAT DIFFICULT
HOW_WOULD_YOU_RATE_THE_OVERALL_FUNCTION_OF_YOUR_KNEE_DURING_YOUR_USUAL_DAILY_ACTIVITIES?: NEARLY NORMAL
LIMPING: THE SYMPTOM AFFECTS MY ACTIVITY MODERATELY
WEAKNESS: THE SYMPTOM AFFECTS MY ACTIVITY SLIGHTLY
SIT WITH YOUR KNEE BENT: ACTIVITY IS NOT DIFFICULT
GO DOWN STAIRS: ACTIVITY IS SOMEWHAT DIFFICULT
GO DOWN STAIRS: ACTIVITY IS SOMEWHAT DIFFICULT
PAIN: THE SYMPTOM AFFECTS MY ACTIVITY MODERATELY
AS_A_RESULT_OF_YOUR_KNEE_INJURY,_HOW_WOULD_YOU_RATE_YOUR_CURRENT_LEVEL_OF_DAILY_ACTIVITY?: ABNORMAL
RISE FROM A CHAIR: ACTIVITY IS NOT DIFFICULT

## 2024-11-26 NOTE — PROGRESS NOTES
PHYSICAL THERAPY EVALUATION  Type of Visit: Evaluation       Fall Risk Screen:  Fall screen completed by: PT  Have you fallen 2 or more times in the past year?: No  Have you fallen and had an injury in the past year?: No  Is patient a fall risk?: No    Subjective         Presenting condition or subjective complaint: (Patient-Rptd) Sore knee. Pt presents to PT with a chief complaint of R knee pain with reported onset ~3 weeks ago. Pt reports potential correlation to kneeling while painting/trimming. Pt denies any pain in the moment, but rather reported increased pain the following weekend when trying to climb stairs. Pt reports some clicking/popping with stairs as well.   Date of onset: 11/01/24    Relevant medical history:     Dates & types of surgery:      Prior diagnostic imaging/testing results: (Patient-Rptd) X-ray     Prior therapy history for the same diagnosis, illness or injury: (Patient-Rptd) No        Living Environment  Social support: (Patient-Rptd) With a significant other or spouse   Type of home: (Patient-Rptd) House; 2-story   Stairs to enter the home: (Patient-Rptd) Yes (Patient-Rptd) 5 Is there a railing: (Patient-Rptd) Yes     Ramp: (Patient-Rptd) No   Stairs inside the home: (Patient-Rptd) Yes (Patient-Rptd) 20 Is there a railing: (Patient-Rptd) Yes     Help at home: (Patient-Rptd) Home and Yard maintenance tasks  Equipment owned:       Employment: (Patient-Rptd) No    Hobbies/Interests:      Patient goals for therapy: (Patient-Rptd) Walk without pain    Pain assessment: See objective evaluation for additional pain details     Objective   KNEE EVALUATION  PAIN: Pain Level at Rest: 1/10  Pain Level with Use: 8/10  Pain Location: R anterior-medial knee  Pain Quality: Aching  Pain Frequency: intermittent  Pain is Worst: w/ activity  Pain is Exacerbated By: walking, stair climbing (Descent>ascent), lunging, kneeling  Pain is Relieved By: cold, NSAIDs, and rest  Pain Progression:  Improved  INTEGUMENTARY (edema, incisions):   POSTURE:   GAIT:  Weightbearing Status: WBAT  Assistive Device(s):   Gait Deviations: WFL  Mild loss of r knee ext  BALANCE/PROPRIOCEPTION:   WEIGHTBEARING ALIGNMENT:   NON-WEIGHTBEARING ALIGNMENT:   ROM:   (Degrees) Left AROM Left PROM  Right AROM Right PROM   Knee Flexion  130  130   Knee Extension  0  -1   Pain:   End feel:     STRENGTH:  R hip ABD: 4/5  FLEXIBILITY: Decreased quadriceps R  SPECIAL TESTS: WNL  FUNCTIONAL TESTS: Double Leg Squat: Anterior knee translation, Improper use of glutes/hips, and pain 2/10  PALPATION:  TTP at R medial patella  JOINT MOBILITY:  hypomobile patella medial    Assessment & Plan   CLINICAL IMPRESSIONS  Medical Diagnosis: R knee pain    Treatment Diagnosis: R knee pain   Impression/Assessment: Patient is a 74 year old female with R knee complaints.  The following significant findings have been identified: Pain, Decreased ROM/flexibility, Decreased joint mobility, Decreased strength, Impaired gait, and Impaired muscle performance. These impairments interfere with their ability to perform self care tasks, recreational activities, household chores, household mobility, and community mobility as compared to previous level of function.     Clinical Decision Making (Complexity):  Clinical Presentation: Stable/Uncomplicated  Clinical Presentation Rationale: based on medical and personal factors listed in PT evaluation  Clinical Decision Making (Complexity): Low complexity    PLAN OF CARE  Treatment Interventions:  Interventions: Gait Training, Manual Therapy, Neuromuscular Re-education, Therapeutic Activity, Therapeutic Exercise    Long Term Goals     PT Goal 1  Goal Identifier: Stairs  Goal Description: Pt will be able to ascend/descend stairs reciprocally w/o knee pain  Rationale: to maximize safety and independence within the home;to maximize safety and independence within the community  Goal Progress: pain 4/10 at times w/  descent  Target Date: 02/19/25      Frequency of Treatment: 2x month  Duration of Treatment: 3 months    Recommended Referrals to Other Professionals:   Education Assessment:   Learner/Method: Patient;No Barriers to Learning    Risks and benefits of evaluation/treatment have been explained.   Patient/Family/caregiver agrees with Plan of Care.     Evaluation Time:     RETIRED PT Eval, Low Complexity Minutes (37867): 20       Signing Clinician: [facility]                                                                                   OUTPATIENT PHYSICAL THERAPY      PLAN OF TREATMENT FOR OUTPATIENT REHABILITATION   Patient's Last Name, First Name, Brianna Dick YOB: 1950   Provider's Name   [facility]   Medical Record No.  [id number]     Onset Date: 11/01/24  Start of Care Date: 11/26/24     Medical Diagnosis:  R knee pain      PT Treatment Diagnosis:  R knee pain Plan of Treatment  Frequency/Duration: 2x month/ 3 months    Certification date from 11/26/24 to 02/18/25         See note for plan of treatment details and functional goals     Kevin Jones, PT                         I CERTIFY THE NEED FOR THESE SERVICES FURNISHED UNDER        THIS PLAN OF TREATMENT AND WHILE UNDER MY CARE .             Physician Signature               Date    X_____________________________________________________                  Referring Provider:  Marybeth Ward    Initial Assessment  See Epic Evaluation- Start of Care Date: 11/26/24

## 2024-11-27 PROBLEM — M25.561 ACUTE PAIN OF RIGHT KNEE: Status: ACTIVE | Noted: 2024-11-27

## 2024-12-03 ENCOUNTER — LAB REQUISITION (OUTPATIENT)
Dept: LAB | Facility: CLINIC | Age: 74
End: 2024-12-03
Payer: COMMERCIAL

## 2024-12-03 DIAGNOSIS — E87.5 HYPERKALEMIA: ICD-10-CM

## 2024-12-03 PROCEDURE — 80048 BASIC METABOLIC PNL TOTAL CA: CPT | Performed by: FAMILY MEDICINE

## 2024-12-03 PROCEDURE — 82088 ASSAY OF ALDOSTERONE: CPT | Mod: ORL | Performed by: FAMILY MEDICINE

## 2024-12-03 PROCEDURE — 82374 ASSAY BLOOD CARBON DIOXIDE: CPT | Performed by: FAMILY MEDICINE

## 2024-12-03 PROCEDURE — 82088 ASSAY OF ALDOSTERONE: CPT | Performed by: FAMILY MEDICINE

## 2024-12-04 LAB
ANION GAP SERPL CALCULATED.3IONS-SCNC: 10 MMOL/L (ref 7–15)
BUN SERPL-MCNC: 28.6 MG/DL (ref 8–23)
CALCIUM SERPL-MCNC: 10 MG/DL (ref 8.8–10.4)
CHLORIDE SERPL-SCNC: 105 MMOL/L (ref 98–107)
CREAT SERPL-MCNC: 0.77 MG/DL (ref 0.51–0.95)
EGFRCR SERPLBLD CKD-EPI 2021: 80 ML/MIN/1.73M2
GLUCOSE SERPL-MCNC: 79 MG/DL (ref 70–99)
HCO3 SERPL-SCNC: 27 MMOL/L (ref 22–29)
POTASSIUM SERPL-SCNC: 4.7 MMOL/L (ref 3.4–5.3)
SODIUM SERPL-SCNC: 142 MMOL/L (ref 135–145)

## 2024-12-06 LAB — ALDOST SERPL-MCNC: 12.4 NG/DL (ref 0–31)

## 2024-12-18 ENCOUNTER — THERAPY VISIT (OUTPATIENT)
Dept: PHYSICAL THERAPY | Facility: CLINIC | Age: 74
End: 2024-12-18
Payer: COMMERCIAL

## 2024-12-18 DIAGNOSIS — M25.561 ACUTE PAIN OF RIGHT KNEE: Primary | ICD-10-CM

## 2024-12-18 PROCEDURE — 97110 THERAPEUTIC EXERCISES: CPT | Mod: GP | Performed by: PHYSICAL THERAPIST

## 2025-01-25 ENCOUNTER — HEALTH MAINTENANCE LETTER (OUTPATIENT)
Age: 75
End: 2025-01-25

## 2025-05-14 ENCOUNTER — TRANSCRIBE ORDERS (OUTPATIENT)
Dept: OTHER | Age: 75
End: 2025-05-14

## 2025-05-14 DIAGNOSIS — M54.6 ACUTE BILATERAL THORACIC BACK PAIN: Primary | ICD-10-CM

## 2025-06-18 ENCOUNTER — THERAPY VISIT (OUTPATIENT)
Dept: PHYSICAL THERAPY | Facility: CLINIC | Age: 75
End: 2025-06-18
Attending: FAMILY MEDICINE
Payer: COMMERCIAL

## 2025-06-18 ENCOUNTER — OFFICE VISIT (OUTPATIENT)
Dept: OPHTHALMOLOGY | Facility: CLINIC | Age: 75
End: 2025-06-18
Attending: OPHTHALMOLOGY
Payer: COMMERCIAL

## 2025-06-18 DIAGNOSIS — H04.123 DRY EYES, BILATERAL: ICD-10-CM

## 2025-06-18 DIAGNOSIS — H52.203 MYOPIA OF BOTH EYES WITH ASTIGMATISM AND PRESBYOPIA: Primary | ICD-10-CM

## 2025-06-18 DIAGNOSIS — Z96.1 PSEUDOPHAKIA, BOTH EYES: ICD-10-CM

## 2025-06-18 DIAGNOSIS — M54.6 CHRONIC BILATERAL THORACIC BACK PAIN: Primary | ICD-10-CM

## 2025-06-18 DIAGNOSIS — H52.13 MYOPIA OF BOTH EYES WITH ASTIGMATISM AND PRESBYOPIA: Primary | ICD-10-CM

## 2025-06-18 DIAGNOSIS — G89.29 CHRONIC BILATERAL THORACIC BACK PAIN: Primary | ICD-10-CM

## 2025-06-18 DIAGNOSIS — H52.4 MYOPIA OF BOTH EYES WITH ASTIGMATISM AND PRESBYOPIA: Primary | ICD-10-CM

## 2025-06-18 PROBLEM — M25.561 ACUTE PAIN OF RIGHT KNEE: Status: RESOLVED | Noted: 2024-11-27 | Resolved: 2025-06-18

## 2025-06-18 PROCEDURE — G0463 HOSPITAL OUTPT CLINIC VISIT: HCPCS | Performed by: OPHTHALMOLOGY

## 2025-06-18 PROCEDURE — 92014 COMPRE OPH EXAM EST PT 1/>: CPT | Performed by: OPHTHALMOLOGY

## 2025-06-18 PROCEDURE — 97161 PT EVAL LOW COMPLEX 20 MIN: CPT | Mod: GP | Performed by: PHYSICAL THERAPIST

## 2025-06-18 PROCEDURE — 97110 THERAPEUTIC EXERCISES: CPT | Mod: GP | Performed by: PHYSICAL THERAPIST

## 2025-06-18 ASSESSMENT — REFRACTION_WEARINGRX
OS_CYLINDER: +0.75
OD_ADD: +2.50
OS_ADD: +2.50
OD_SPHERE: -0.50
OD_CYLINDER: +0.50
OS_SPHERE: -0.75
SPECS_TYPE: PAL
OD_AXIS: 040
OS_AXIS: 120

## 2025-06-18 ASSESSMENT — CONF VISUAL FIELD
OS_INFERIOR_NASAL_RESTRICTION: 0
OD_SUPERIOR_TEMPORAL_RESTRICTION: 0
OS_NORMAL: 1
OD_INFERIOR_NASAL_RESTRICTION: 0
OD_NORMAL: 1
OD_SUPERIOR_NASAL_RESTRICTION: 0
OD_INFERIOR_TEMPORAL_RESTRICTION: 0
OS_INFERIOR_TEMPORAL_RESTRICTION: 0
METHOD: COUNTING FINGERS
OS_SUPERIOR_NASAL_RESTRICTION: 0
OS_SUPERIOR_TEMPORAL_RESTRICTION: 0

## 2025-06-18 ASSESSMENT — VISUAL ACUITY
OD_CC: 20/20
CORRECTION_TYPE: GLASSES
METHOD: SNELLEN - LINEAR
OS_CC: 20/20

## 2025-06-18 ASSESSMENT — CUP TO DISC RATIO
OD_RATIO: 0.3
OS_RATIO: 0.3

## 2025-06-18 ASSESSMENT — SLIT LAMP EXAM - LIDS
COMMENTS: NORMAL
COMMENTS: NORMAL

## 2025-06-18 ASSESSMENT — TONOMETRY
IOP_METHOD: TONOPEN
OS_IOP_MMHG: 19
OD_IOP_MMHG: 18

## 2025-06-18 ASSESSMENT — EXTERNAL EXAM - RIGHT EYE: OD_EXAM: NORMAL

## 2025-06-18 ASSESSMENT — EXTERNAL EXAM - LEFT EYE: OS_EXAM: NORMAL

## 2025-06-18 NOTE — PROGRESS NOTES
PHYSICAL THERAPY EVALUATION  Type of Visit: Evaluation       Fall Risk Screen:  Have you fallen 2 or more times in the past year?: No  Have you fallen and had an injury in the past year?: No  Is patient receiving Physical Therapy Services?: No    Subjective         Presenting condition or subjective complaint:  Pt presents to PT with a chief complaint of bilateral mid-lower thoracic pain with reported gradual onset over the past few months. Pain worse with prolonged standing, cooking/cleaning, and prolonged walking. Pain improves with rest and sitting  Date of onset: 02/18/25    Relevant medical history:   osteopenia  Dates & types of surgery:      Prior diagnostic imaging/testing results:     none  Prior therapy history for the same diagnosis, illness or injury:    none      Employment:    retired  Hobbies/Interests:  gardening    Patient goals for therapy:  stand longer, walk further     Pain assessment: See objective evaluation for additional pain details     Objective   CERVICAL SPINE EVALUATION  PAIN: Pain Level at Rest: 3/10  Pain Level with Use: 7/10  Pain Location: thoracic spine and mid-lower thoracic  Pain Quality: Aching  Pain Frequency: intermittent  Pain is Worst: evening  Pain is Exacerbated By: prolonged standing, walking, lifting  Pain is Relieved By: rest and stretch  Pain Progression: Improved  INTEGUMENTARY (edema, incisions):   POSTURE: kyphosis  GAIT:   Weightbearing Status:   Assistive Device(s):   Gait Deviations:   BALANCE/PROPRIOCEPTION:   WEIGHTBEARING ALIGNMENT:   ROM:   (Degrees) Left AROM Right AROM    Cervical Flexion     Cervical Extension     Cervical Side bend      Cervical Rotation      Cervical Protrusion     Cervical Retraction     Thoracic Flexion Min loss, pain ++    Thoracic Extension Mod loss, pain ++    Thoracic Rotation Min loss WNL     Left AROM Left PROM Right AROM Right PROM   Shoulder Flexion       Shoulder Extension       Shoulder Abduction       Shoulder Adduction        Shoulder IR       Shoulder ER       Shoulder Horiz Abduction       Shoulder Horiz Adduction       Pain:   End Feel:     MYOTOMES: WNL  DTR S:   CORD SIGNS:   DERMATOMES:   NEURAL TENSION:   FLEXIBILITY: Decreased latissimus L, Decreased latissimus R   SPECIAL TESTS:   PALPATION: TTP at mid-lower thoracic paraspinals  SPINAL SEGMENTAL CONCLUSIONS: Pain ++ with PA mobility at T8-T10      Assessment & Plan   CLINICAL IMPRESSIONS  Medical Diagnosis: Bilateral mid-thoracic pain    Treatment Diagnosis: Bilateral mid-thoracic pain   Impression/Assessment: Patient is a 75 year old female with thoracic pain complaints.  The following significant findings have been identified: Pain, Decreased ROM/flexibility, Decreased joint mobility, Decreased strength, and Impaired muscle performance. These impairments interfere with their ability to perform self care tasks, recreational activities, household chores, and community mobility as compared to previous level of function.     Clinical Decision Making (Complexity):  Clinical Presentation: Stable/Uncomplicated  Clinical Presentation Rationale: based on medical and personal factors listed in PT evaluation  Clinical Decision Making (Complexity): Low complexity    PLAN OF CARE  Treatment Interventions:  Interventions: Manual Therapy, Neuromuscular Re-education, Therapeutic Activity, Therapeutic Exercise    Long Term Goals     PT Goal 1  Goal Identifier: Standing  Goal Description: Pt will be able to stand >30 minutes w/o needing to sit due to pain  Rationale: to maximize safety and independence with performance of ADLs and functional tasks  Goal Progress: pain 7/10  Target Date: 09/10/25      Frequency of Treatment: 1x week  Duration of Treatment: 4 weeks then 2 x month for 8 weeks    Recommended Referrals to Other Professionals:   Education Assessment:   Learner/Method: Patient;No Barriers to Learning    Risks and benefits of evaluation/treatment have been explained.    Patient/Family/caregiver agrees with Plan of Care.     Evaluation Time:     PT Eval, Low Complexity Minutes (34372): 20       Signing Clinician: Kevin Jones PT        [facility]                                                                                   OUTPATIENT PHYSICAL THERAPY      PLAN OF TREATMENT FOR OUTPATIENT REHABILITATION   Patient's Last Name, First Name, Brianna Dick YOB: 1950   Provider's Name   [facility]   Medical Record No.  [id number]     Onset Date: 02/18/25  Start of Care Date: 06/18/25     Medical Diagnosis:  Bilateral mid-thoracic pain      PT Treatment Diagnosis:  Bilateral mid-thoracic pain Plan of Treatment  Frequency/Duration: 1x week/ 4 weeks then 2 x month for 8 weeks    Certification date from 06/18/25 to 09/10/25         See note for plan of treatment details and functional goals     Kevin Jones, PT                         I CERTIFY THE NEED FOR THESE SERVICES FURNISHED UNDER        THIS PLAN OF TREATMENT AND WHILE UNDER MY CARE .             Physician Signature               Date    X_____________________________________________________                  Referring Provider:  Marybeth Ward    Initial Assessment  See Epic Evaluation- Start of Care Date: 06/18/25

## 2025-06-18 NOTE — PROGRESS NOTES
HPI       COMPREHENSIVE EYE EXAM    In both eyes.  Since onset it is stable.  Associated symptoms include Negative for eye pain, flashes and floaters.  Treatments tried include artificial tears.             Comments    Brianna Vallejo is a(n) 75 year old female who presents for a comprehensive exam. Last eye exam was 1 year(s) ago. Since exam, vision is about the same. Uses lubricating drops. No flashes and floaters. No eye pain.     GANESH Jamison 10:11 AM June 18, 2025     Fhx of mac degen - reported brother and sister day.              Last edited by Ottoniel Farooq COMT on 6/18/2025 10:16 AM.         Review of systems for the eyes was negative other than the pertinent positives/negatives listed in the HPI.      Assessment & Plan    HPI:  Brianna Vallejo is a 75 year old female with history of allergies, Cataract extraction/intraocular lens presents for annual exam. She uses AT qam as needed. No flashes or floaters      POHx: pseudophakia, myopia with astigmatism and presbyopia    PMHx: allergies  Current Medications:   Current Outpatient Medications   Medication Sig Dispense Refill    calcium carbonate 500 mg, elemental, (OSCAL 500) 1250 (500 Ca) MG TABS tablet 1 tablet with meals Orally once a day      carboxymethylcellulose PF (REFRESH PLUS) 0.5 % ophthalmic solution Place 1 drop into both eyes 3 times daily as needed for dry eyes      cholecalciferol 50 MCG (2000 UT) tablet 2 tab(s) orally once a day      loratadine (CLARITIN) 10 MG tablet 1 tablet Orally Once a day for 30 day(s)      multiple vitamin  tablet TABS Take 1 tablet by mouth daily       No current facility-administered medications for this visit.     FHx: dad, brother, sister ARMD  PSHx: Cataract extraction/intraocular lens Celestina 2023      Current Eye Medications:  AT qam    Assessment & Plan:  (H52.13,  H52.203,  H52.4) Myopia of both eyes with astigmatism and presbyopia  (primary encounter diagnosis)  Doing great with current MRx    (H04.123) Dry  eyes, bilateral  Continue at [facility]    (Z96.1) Pseudophakia, both eyes  Doing great  Tr posterior capsular opacity (PCO) right eye outside of visual axis      Return in about 1 year (around 6/18/2026) for Annual Visit-v/t/d/MRx.        Catracho Ruiz MD     Attending Physician Attestation:  Complete documentation of historical and exam elements from today's encounter can be found in the full encounter summary report (not reduplicated in this progress note).  I personally obtained the chief complaint(s) and history of present illness.  I confirmed and edited as necessary the review of systems, past medical/surgical history, family history, social history, and examination findings as documented by others; and I examined the patient myself.  I personally reviewed the relevant tests, images, and reports as documented above.  I formulated and edited as necessary the assessment and plan and discussed the findings and management plan with the patient and family. - Catracho Ruiz MD

## 2025-06-24 ENCOUNTER — OFFICE VISIT (OUTPATIENT)
Dept: URGENT CARE | Facility: URGENT CARE | Age: 75
End: 2025-06-24
Payer: COMMERCIAL

## 2025-06-24 VITALS
OXYGEN SATURATION: 98 % | SYSTOLIC BLOOD PRESSURE: 108 MMHG | TEMPERATURE: 98.4 F | BODY MASS INDEX: 21.34 KG/M2 | WEIGHT: 125 LBS | DIASTOLIC BLOOD PRESSURE: 71 MMHG | RESPIRATION RATE: 14 BRPM | HEIGHT: 64 IN | HEART RATE: 88 BPM

## 2025-06-24 DIAGNOSIS — R21 RASH AND NONSPECIFIC SKIN ERUPTION: Primary | ICD-10-CM

## 2025-06-24 PROCEDURE — 3074F SYST BP LT 130 MM HG: CPT | Performed by: PHYSICIAN ASSISTANT

## 2025-06-24 PROCEDURE — 3078F DIAST BP <80 MM HG: CPT | Performed by: PHYSICIAN ASSISTANT

## 2025-06-24 PROCEDURE — 99203 OFFICE O/P NEW LOW 30 MIN: CPT | Performed by: PHYSICIAN ASSISTANT

## 2025-06-24 RX ORDER — TRIAMCINOLONE ACETONIDE 1 MG/G
CREAM TOPICAL 2 TIMES DAILY
Qty: 60 G | Refills: 0 | Status: SHIPPED | OUTPATIENT
Start: 2025-06-24 | End: 2025-07-01

## 2025-06-25 ENCOUNTER — THERAPY VISIT (OUTPATIENT)
Dept: PHYSICAL THERAPY | Facility: CLINIC | Age: 75
End: 2025-06-25
Attending: FAMILY MEDICINE
Payer: COMMERCIAL

## 2025-06-25 DIAGNOSIS — G89.29 CHRONIC BILATERAL THORACIC BACK PAIN: Primary | ICD-10-CM

## 2025-06-25 DIAGNOSIS — M54.6 CHRONIC BILATERAL THORACIC BACK PAIN: Primary | ICD-10-CM

## 2025-06-25 PROCEDURE — 97110 THERAPEUTIC EXERCISES: CPT | Mod: GP | Performed by: PHYSICAL THERAPIST

## 2025-06-25 PROCEDURE — 97140 MANUAL THERAPY 1/> REGIONS: CPT | Mod: GP | Performed by: PHYSICAL THERAPIST

## 2025-06-25 NOTE — PATIENT INSTRUCTIONS
Based on our discussion, I have outlined the following instructions for you:      - Use the cream or ointment prescribed to you on the rash to help with the itching and to make the rash go away.  - Take Zyrtec once or twice a day to help with any leftover itching.    Next appointment(s): - Follow-up appointment if symptoms do not improve within 3 to 5 days or if rash worsens.    Thank you again for your visit, and we look forward to supporting you in your journey to better health.   Paroxysmal atrial fibrillation

## 2025-06-25 NOTE — PROGRESS NOTES
"Chief Complaint   Patient presents with    Derm Problem     Pt has a rash on her left ankle x a couple days, itchy, thinks she had a bite       Assessment & Plan  Assessment  - Rash likely due to dermatitis or irritation from a bug bite or plant matter exposure.  - less likely    Plan  - Treat the rash with a topical steroid to improve itching and resolve the rash  - Take Zyrtec once or twice daily for residual itching  - Return for evaluation if no improvement within three to five days or if the rash grows, becomes more hot or tender, or if feeling ill     ICD-10-CM    1. Rash and nonspecific skin eruption  R21 triamcinolone (KENALOG) 0.1 % external cream          SUBJECTIVE:  History of Present Illness-Brianna Vallejo, a 75-year-old female, reports noticing a rash on her ankle over the last couple of days. She recalls being bitten while in the garden, which she believes may have triggered the rash. The rash is localized to the ankle area and is characterized by itching, without pain. She has not observed any other symptoms and feels well otherwise.     ROS: Pertinent ROS neg other than the symptoms noted above in the HPI.     OBJECTIVE:  /71   Pulse 88   Temp 98.4  F (36.9  C) (Temporal)   Resp 14   Ht 1.626 m (5' 4\")   Wt 56.7 kg (125 lb)   SpO2 98%   BMI 21.46 kg/m     Physical Exam  - SKIN: Erythematous rash on the ankle with pruritus.          DIAGNOSTICS       Results for orders placed or performed in visit on 12/03/24   Basic metabolic panel     Status: Abnormal   Result Value Ref Range    Sodium 142 135 - 145 mmol/L    Potassium 4.7 3.4 - 5.3 mmol/L    Chloride 105 98 - 107 mmol/L    Carbon Dioxide (CO2) 27 22 - 29 mmol/L    Anion Gap 10 7 - 15 mmol/L    Urea Nitrogen 28.6 (H) 8.0 - 23.0 mg/dL    Creatinine 0.77 0.51 - 0.95 mg/dL    GFR Estimate 80 >60 mL/min/1.73m2    Calcium 10.0 8.8 - 10.4 mg/dL    Glucose 79 70 - 99 mg/dL   Aldosterone     Status: Normal   Result Value Ref Range    " Aldosterone 12.4 0.0 - 31.0 ng/dL        Mo Hudson PA-C    Consent was obtained from the patient to use an AI documentation tool in the creation of this note.  Any grammatical or spelling errors are non-intentional. Please contact the author of this note directly if you are in need of any clarification.     Current Outpatient Medications   Medication Sig Dispense Refill    triamcinolone (KENALOG) 0.1 % external cream Apply topically 2 times daily for 7 days. 60 g 0    calcium carbonate 500 mg, elemental, (OSCAL 500) 1250 (500 Ca) MG TABS tablet 1 tablet with meals Orally once a day      carboxymethylcellulose PF (REFRESH PLUS) 0.5 % ophthalmic solution Place 1 drop into both eyes 3 times daily as needed for dry eyes      cholecalciferol 50 MCG (2000 UT) tablet 2 tab(s) orally once a day      loratadine (CLARITIN) 10 MG tablet 1 tablet Orally Once a day for 30 day(s)      multiple vitamin  tablet TABS Take 1 tablet by mouth daily       No current facility-administered medications for this visit.      Patient Active Problem List   Diagnosis    CARDIOVASCULAR SCREENING; LDL GOAL LESS THAN 160    Gout    Genital herpes    Low back pain    Chemical dependency (H)    Anxiety disorder    Insomnia    Combined form of age-related cataract, both eyes    Cervical pain    Chronic right shoulder pain    Chronic bilateral thoracic back pain      Past Medical History:   Diagnosis Date    Anxiety disorder     Chemical dependency (H)     in recovery    Genital herpes 1990s    2008, 2012    Gout     Insomnia     LBP (low back pain)     Nonsenile cataract     Osteopoikilosis     RBBB     neg cardiac workup 2008    Ulcerative colitis 1967     Past Surgical History:   Procedure Laterality Date    CATARACT IOL, RT/LT Left     LARYNX SURGERY  03/04/2010    PHACOEMULSIFICATION CLEAR CORNEA WITH STANDARD INTRAOCULAR LENS IMPLANT Left 05/02/2023    Procedure: LEFT EYE PHACOEMULSIFICATION, CATARACT, WITH INTRAOCULAR LENS IMPLANT;   Surgeon: Jade Oscar MD;  Location: [facility] [place]    PHACOEMULSIFICATION CLEAR CORNEA WITH STANDARD INTRAOCULAR LENS IMPLANT Right 2023    Procedure: RIGHT EYE PHACOEMULSIFICATION, CATARACT, WITH INTRAOCULAR LENS IMPLANT;  Surgeon: Jade Oscar MD;  Location: [facility] [place] OR     Family History   Problem Relation Age of Onset    Breast Cancer Mother     Macular Degeneration Father     Cerebrovascular Disease Father     Hypertension Father     Neurologic Disorder Maternal Grandmother     Arthritis Maternal Grandmother     C.A.D. Maternal Grandfather     Respiratory Maternal Grandfather     Cerebrovascular Disease Paternal Grandmother     Diabetes Paternal Grandmother     Arthritis Paternal Grandmother     Alcohol/Drug Paternal Grandfather     Diabetes Brother     Macular Degeneration Brother     Macular Degeneration Sister     Glaucoma No family hx of      Social History     Tobacco Use    Smoking status: Former     Current packs/day: 0.00     Average packs/day: 1 pack/day for 6.0 years (6.0 ttl pk-yrs)     Types: Cigarettes     Start date: 1968     Quit date: 1974     Years since quittin.9    Smokeless tobacco: Never   Substance Use Topics    Alcohol use: No

## 2025-07-16 ENCOUNTER — THERAPY VISIT (OUTPATIENT)
Dept: PHYSICAL THERAPY | Facility: CLINIC | Age: 75
End: 2025-07-16
Payer: COMMERCIAL

## 2025-07-16 DIAGNOSIS — G89.29 CHRONIC BILATERAL THORACIC BACK PAIN: Primary | ICD-10-CM

## 2025-07-16 DIAGNOSIS — M54.6 CHRONIC BILATERAL THORACIC BACK PAIN: Primary | ICD-10-CM

## 2025-07-16 PROCEDURE — 97110 THERAPEUTIC EXERCISES: CPT | Mod: GP | Performed by: PHYSICAL THERAPIST

## 2025-07-16 PROCEDURE — 97112 NEUROMUSCULAR REEDUCATION: CPT | Mod: GP | Performed by: PHYSICAL THERAPIST

## 2025-07-30 ENCOUNTER — THERAPY VISIT (OUTPATIENT)
Dept: PHYSICAL THERAPY | Facility: CLINIC | Age: 75
End: 2025-07-30
Payer: COMMERCIAL

## 2025-07-30 DIAGNOSIS — M54.6 CHRONIC BILATERAL THORACIC BACK PAIN: Primary | ICD-10-CM

## 2025-07-30 DIAGNOSIS — G89.29 CHRONIC BILATERAL THORACIC BACK PAIN: Primary | ICD-10-CM

## 2025-07-30 PROCEDURE — 97140 MANUAL THERAPY 1/> REGIONS: CPT | Mod: GP | Performed by: PHYSICAL THERAPIST

## 2025-07-30 PROCEDURE — 97110 THERAPEUTIC EXERCISES: CPT | Mod: GP | Performed by: PHYSICAL THERAPIST

## 2025-08-07 ENCOUNTER — OFFICE VISIT (OUTPATIENT)
Dept: URGENT CARE | Facility: URGENT CARE | Age: 75
End: 2025-08-07
Payer: COMMERCIAL

## 2025-08-07 VITALS
SYSTOLIC BLOOD PRESSURE: 116 MMHG | OXYGEN SATURATION: 100 % | DIASTOLIC BLOOD PRESSURE: 78 MMHG | HEART RATE: 78 BPM | BODY MASS INDEX: 21.34 KG/M2 | RESPIRATION RATE: 16 BRPM | WEIGHT: 125 LBS | HEIGHT: 64 IN | TEMPERATURE: 98.3 F

## 2025-08-07 DIAGNOSIS — L98.9 SKIN LESION: Primary | ICD-10-CM

## 2025-08-07 RX ORDER — VALACYCLOVIR HYDROCHLORIDE 1 G/1
1000 TABLET, FILM COATED ORAL 3 TIMES DAILY
Qty: 21 TABLET | Refills: 0 | Status: SHIPPED | OUTPATIENT
Start: 2025-08-07 | End: 2025-08-14

## (undated) DEVICE — EYE SHIELD PLASTIC

## (undated) DEVICE — GLOVE BIOGEL PI MICRO SZ 7.0 48570

## (undated) DEVICE — EYE CANN IRR 27GA ANTERIOR CHAMBER 581280

## (undated) DEVICE — EYE KNIFE SLIT XSTAR VISITEC 2.6MM 45DEG 373726

## (undated) DEVICE — EYE PACK CUSTOM ANTERIOR 30DEG TIP CENTURION PPK6682-04

## (undated) DEVICE — EYE KNIFE STILETTO VISITEC 1.1MM ANG 45DEG SIDEPORT 376620

## (undated) DEVICE — EYE TIP IRRIGATION & ASPIRATION POLYMER CVD 0.3MM 8065751512

## (undated) DEVICE — PACK CATARACT CUSTOM ASC SEY15CPUMC

## (undated) DEVICE — EYE CANN IRR 25GA CYSTOTOME 581610

## (undated) DEVICE — SOL WATER IRRIG 500ML BOTTLE 2F7113

## (undated) DEVICE — LINEN TOWEL PACK X5 5464

## (undated) RX ORDER — FENTANYL CITRATE 50 UG/ML
INJECTION, SOLUTION INTRAMUSCULAR; INTRAVENOUS
Status: DISPENSED
Start: 2023-05-02

## (undated) RX ORDER — FENTANYL CITRATE 50 UG/ML
INJECTION, SOLUTION INTRAMUSCULAR; INTRAVENOUS
Status: DISPENSED
Start: 2023-05-16

## (undated) RX ORDER — ACETAMINOPHEN 325 MG/1
TABLET ORAL
Status: DISPENSED
Start: 2023-05-02

## (undated) RX ORDER — REGADENOSON 0.08 MG/ML
INJECTION, SOLUTION INTRAVENOUS
Status: DISPENSED
Start: 2019-03-18